# Patient Record
Sex: MALE | URBAN - METROPOLITAN AREA
[De-identification: names, ages, dates, MRNs, and addresses within clinical notes are randomized per-mention and may not be internally consistent; named-entity substitution may affect disease eponyms.]

---

## 2018-02-17 ENCOUNTER — EMERGENCY (EMERGENCY)
Facility: HOSPITAL | Age: 28
LOS: 1 days | Discharge: PSYCHIATRIC OR SUBSTANCE USE FACILITY | End: 2018-02-19
Attending: EMERGENCY MEDICINE | Admitting: EMERGENCY MEDICINE
Payer: SELF-PAY

## 2018-02-17 VITALS
HEIGHT: 68 IN | OXYGEN SATURATION: 99 % | HEART RATE: 55 BPM | RESPIRATION RATE: 16 BRPM | DIASTOLIC BLOOD PRESSURE: 88 MMHG | WEIGHT: 149.91 LBS | TEMPERATURE: 98 F | SYSTOLIC BLOOD PRESSURE: 124 MMHG

## 2018-02-17 DIAGNOSIS — R41.82 ALTERED MENTAL STATUS, UNSPECIFIED: ICD-10-CM

## 2018-02-17 DIAGNOSIS — R41.0 DISORIENTATION, UNSPECIFIED: ICD-10-CM

## 2018-02-17 LAB
ALBUMIN SERPL ELPH-MCNC: 4.2 G/DL — SIGNIFICANT CHANGE UP (ref 3.3–5)
ALP SERPL-CCNC: 60 U/L — SIGNIFICANT CHANGE UP (ref 40–120)
ALT FLD-CCNC: 35 U/L — SIGNIFICANT CHANGE UP (ref 12–78)
AMPHET UR-MCNC: NEGATIVE — SIGNIFICANT CHANGE UP
ANION GAP SERPL CALC-SCNC: 7 MMOL/L — SIGNIFICANT CHANGE UP (ref 5–17)
APAP SERPL-MCNC: < 2 UG/ML — SIGNIFICANT CHANGE UP (ref 10–30)
AST SERPL-CCNC: 29 U/L — SIGNIFICANT CHANGE UP (ref 15–37)
BARBITURATES UR SCN-MCNC: NEGATIVE — SIGNIFICANT CHANGE UP
BASOPHILS # BLD AUTO: 0.1 K/UL — SIGNIFICANT CHANGE UP (ref 0–0.2)
BASOPHILS NFR BLD AUTO: 1.4 % — SIGNIFICANT CHANGE UP (ref 0–2)
BENZODIAZ UR-MCNC: NEGATIVE — SIGNIFICANT CHANGE UP
BILIRUB SERPL-MCNC: 0.9 MG/DL — SIGNIFICANT CHANGE UP (ref 0.2–1.2)
BUN SERPL-MCNC: 12 MG/DL — SIGNIFICANT CHANGE UP (ref 7–23)
CALCIUM SERPL-MCNC: 8.6 MG/DL — SIGNIFICANT CHANGE UP (ref 8.5–10.1)
CHLORIDE SERPL-SCNC: 106 MMOL/L — SIGNIFICANT CHANGE UP (ref 96–108)
CO2 SERPL-SCNC: 29 MMOL/L — SIGNIFICANT CHANGE UP (ref 22–31)
COCAINE METAB.OTHER UR-MCNC: NEGATIVE — SIGNIFICANT CHANGE UP
CREAT SERPL-MCNC: 1.01 MG/DL — SIGNIFICANT CHANGE UP (ref 0.5–1.3)
EOSINOPHIL # BLD AUTO: 0.2 K/UL — SIGNIFICANT CHANGE UP (ref 0–0.5)
EOSINOPHIL NFR BLD AUTO: 3.1 % — SIGNIFICANT CHANGE UP (ref 0–6)
ETHANOL SERPL-MCNC: <10 MG/DL — SIGNIFICANT CHANGE UP (ref 0–10)
GLUCOSE BLDC GLUCOMTR-MCNC: 81 MG/DL — SIGNIFICANT CHANGE UP (ref 70–99)
GLUCOSE SERPL-MCNC: 105 MG/DL — HIGH (ref 70–99)
HCT VFR BLD CALC: 44.1 % — SIGNIFICANT CHANGE UP (ref 39–50)
HGB BLD-MCNC: 14.9 G/DL — SIGNIFICANT CHANGE UP (ref 13–17)
LDH SERPL L TO P-CCNC: 167 U/L — SIGNIFICANT CHANGE UP (ref 84–241)
LYMPHOCYTES # BLD AUTO: 1.8 K/UL — SIGNIFICANT CHANGE UP (ref 1–3.3)
LYMPHOCYTES # BLD AUTO: 23.2 % — SIGNIFICANT CHANGE UP (ref 13–44)
MCHC RBC-ENTMCNC: 31.3 PG — SIGNIFICANT CHANGE UP (ref 27–34)
MCHC RBC-ENTMCNC: 33.8 GM/DL — SIGNIFICANT CHANGE UP (ref 32–36)
MCV RBC AUTO: 92.6 FL — SIGNIFICANT CHANGE UP (ref 80–100)
METHADONE UR-MCNC: NEGATIVE — SIGNIFICANT CHANGE UP
MONOCYTES # BLD AUTO: 0.7 K/UL — SIGNIFICANT CHANGE UP (ref 0–0.9)
MONOCYTES NFR BLD AUTO: 9 % — SIGNIFICANT CHANGE UP (ref 2–14)
NEUTROPHILS # BLD AUTO: 5 K/UL — SIGNIFICANT CHANGE UP (ref 1.8–7.4)
NEUTROPHILS NFR BLD AUTO: 63.3 % — SIGNIFICANT CHANGE UP (ref 43–77)
OPIATES UR-MCNC: NEGATIVE — SIGNIFICANT CHANGE UP
PCP SPEC-MCNC: SIGNIFICANT CHANGE UP
PCP UR-MCNC: NEGATIVE — SIGNIFICANT CHANGE UP
PLATELET # BLD AUTO: 174 K/UL — SIGNIFICANT CHANGE UP (ref 150–400)
POTASSIUM SERPL-MCNC: 3.7 MMOL/L — SIGNIFICANT CHANGE UP (ref 3.5–5.3)
POTASSIUM SERPL-SCNC: 3.7 MMOL/L — SIGNIFICANT CHANGE UP (ref 3.5–5.3)
PROT SERPL-MCNC: 7.6 GM/DL — SIGNIFICANT CHANGE UP (ref 6–8.3)
RBC # BLD: 4.76 M/UL — SIGNIFICANT CHANGE UP (ref 4.2–5.8)
RBC # FLD: 11.2 % — SIGNIFICANT CHANGE UP (ref 10.3–14.5)
SALICYLATES SERPL-MCNC: <1.7 MG/DL — LOW (ref 2.8–20)
SODIUM SERPL-SCNC: 142 MMOL/L — SIGNIFICANT CHANGE UP (ref 135–145)
THC UR QL: NEGATIVE — SIGNIFICANT CHANGE UP
WBC # BLD: 7.9 K/UL — SIGNIFICANT CHANGE UP (ref 3.8–10.5)
WBC # FLD AUTO: 7.9 K/UL — SIGNIFICANT CHANGE UP (ref 3.8–10.5)

## 2018-02-17 PROCEDURE — 93010 ELECTROCARDIOGRAM REPORT: CPT

## 2018-02-17 PROCEDURE — 70450 CT HEAD/BRAIN W/O DYE: CPT | Mod: 26

## 2018-02-17 PROCEDURE — 99053 MED SERV 10PM-8AM 24 HR FAC: CPT

## 2018-02-17 PROCEDURE — 99285 EMERGENCY DEPT VISIT HI MDM: CPT | Mod: 25

## 2018-02-17 PROCEDURE — 90792 PSYCH DIAG EVAL W/MED SRVCS: CPT | Mod: GT
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
## 2018-02-17 NOTE — ED BEHAVIORAL HEALTH ASSESSMENT NOTE - SUBSTANCE ISSUES AND PLAN (INCLUDE STANDING AND PRN MEDICATION)
PRN for agitation: Check EKG, if QTC<500, can use haldol 5mg po q6hr prn agitation, ativan 2mg po q6hr prn agitation - hold haldol if QTC> 500;

## 2018-02-17 NOTE — ED PROVIDER NOTE - NS_ ATTENDINGSCRIBEDETAILS _ED_A_ED_FT
I, Kurtis Hernandez MD,  performed the initial face to face bedside interview with this patient regarding history of present illness, review of symptoms and relevant past medical, social and family history.  I completed an independent physical examination.    The history, relevant review of systems, past medical and surgical history, medical decision making, and physical examination was documented by the scribe in my presence and I attest to the accuracy of the documentation.
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
## 2018-02-17 NOTE — ED BEHAVIORAL HEALTH ASSESSMENT NOTE - DETAILS
MD made aware of need for hold and re-eval by psych MD made aware Hypoglycemic when found on the streets

## 2018-02-17 NOTE — ED ADULT TRIAGE NOTE - CHIEF COMPLAINT QUOTE
Pt. to the ED BIBA found in street confused- As per EMS, BGM was 59 and patient was given glucose - BGM 95- Pt. poor historian at this time- [facility]x

## 2018-02-17 NOTE — ED PROVIDER NOTE - MUSCULOSKELETAL, MLM
Spine appears normal, range of motion is not limited, no muscle or joint tenderness. No evidence of trauma.

## 2018-02-17 NOTE — ED PROVIDER NOTE - MEDICAL DECISION MAKING DETAILS
28 y/o M BIBEMS for confusion, AMS, low BGM of 59mg/dL on scene, glucose administered with positive response to 95mg/dL with plans to receive labs, CBC, EtOH level, CT imaging, EKG

## 2018-02-17 NOTE — ED PROVIDER NOTE - PROGRESS NOTE DETAILS
AS:  pt received from Dr Hernandez at shift change.  Pt seen by telepsych.  Pt too disoriented for conversation, and no collateral, would like to hold pt overnight and reevaluate in am.

## 2018-02-17 NOTE — ED BEHAVIORAL HEALTH ASSESSMENT NOTE - DESCRIPTION
No beh issues - noted to have AMS upon arrival Unable to assess Reports has 2 brothers but didn't know phone numbers; no phone in his possession upon arrival to hospital (per hospital staff)

## 2018-02-17 NOTE — ED BEHAVIORAL HEALTH ASSESSMENT NOTE - OTHER
unable to assess Limited ability to assess due to cognitive issues Possible delusions regarding blood contaminated? demons? Unable to fully assess given cognitive issues Passkrishna in the street Unknown-unable to assess Unknown -unable to assess Unable to assess Unable to asess Unclear housing/social support

## 2018-02-17 NOTE — ED BEHAVIORAL HEALTH ASSESSMENT NOTE - HPI (INCLUDE ILLNESS QUALITY, SEVERITY, DURATION, TIMING, CONTEXT, MODIFYING FACTORS, ASSOCIATED SIGNS AND SYMPTOMS)
Patient with unknown past psych history, unknown domicility, unknown employment, unknown medical history was found wandering in the streets and was noted to be hypoglyceminc in the fields. After medical optimization with glucose and neg preliminary medical work up (inc CT, CBC, BMP, utox) patient was asked to be assessed by psychiatry for making unusual statements such as that "his blood was contaminated by demon."      Pt was seen and evaluated with Upper sor[place] phone  - he was a limited historian; impaired attention/concentration, disoriented and at times, not responding to questions directed by . Patient stated: "They took everything I had." However, when asked to clarify, who these people were, he was unable to do so. Patient denied any past medical history, denied past psychiatric history. Patient had difficulties concentrating on remainder of the evaluation would hold on to the phone and not verbalize at majority of the questions inc questions regarding mood symptoms and psychotic symptoms.     On cognitive exam, patient was noted to be awake, but oriented to his name but not place (inc city, state) or date or situation. Patient had impaired attention/concentration. Patient was unable to comply with remainder of MMSE screening.

## 2018-02-17 NOTE — ED BEHAVIORAL HEALTH ASSESSMENT NOTE - REASON FOR REFERRAL
Patient presented with AMS - hypoglycemic in the fields; making unusual statement about "blood was contaminated by a demon."

## 2018-02-17 NOTE — ED ADULT NURSE NOTE - ED STAT RN HANDOFF DETAILS
Pt received from prior RN awake but appearing confused. Pt denies pain.  Hygiene performed.  Will cont to monitor.

## 2018-02-18 VITALS
HEART RATE: 54 BPM | SYSTOLIC BLOOD PRESSURE: 117 MMHG | DIASTOLIC BLOOD PRESSURE: 73 MMHG | TEMPERATURE: 98 F | OXYGEN SATURATION: 99 %

## 2018-02-18 DIAGNOSIS — R41.0 DISORIENTATION, UNSPECIFIED: ICD-10-CM

## 2018-02-18 LAB
ADD ON TEST-SPECIMEN IN LAB: SIGNIFICANT CHANGE UP
APPEARANCE UR: (no result)
APPEARANCE UR: CLEAR — SIGNIFICANT CHANGE UP
BACTERIA # UR AUTO: (no result)
BACTERIA # UR AUTO: (no result)
BILIRUB UR-MCNC: NEGATIVE — SIGNIFICANT CHANGE UP
BILIRUB UR-MCNC: NEGATIVE — SIGNIFICANT CHANGE UP
COLOR SPEC: YELLOW — SIGNIFICANT CHANGE UP
COLOR SPEC: YELLOW — SIGNIFICANT CHANGE UP
DIFF PNL FLD: (no result)
DIFF PNL FLD: (no result)
EPI CELLS # UR: SIGNIFICANT CHANGE UP
EPI CELLS # UR: SIGNIFICANT CHANGE UP
FOLATE SERPL-MCNC: 10.1 NG/ML — SIGNIFICANT CHANGE UP (ref 4.8–24.2)
GLUCOSE BLDC GLUCOMTR-MCNC: 82 MG/DL — SIGNIFICANT CHANGE UP (ref 70–99)
GLUCOSE BLDC GLUCOMTR-MCNC: 99 MG/DL — SIGNIFICANT CHANGE UP (ref 70–99)
GLUCOSE UR QL: NEGATIVE MG/DL — SIGNIFICANT CHANGE UP
GLUCOSE UR QL: NEGATIVE MG/DL — SIGNIFICANT CHANGE UP
KETONES UR-MCNC: (no result)
KETONES UR-MCNC: NEGATIVE — SIGNIFICANT CHANGE UP
LEUKOCYTE ESTERASE UR-ACNC: NEGATIVE — SIGNIFICANT CHANGE UP
LEUKOCYTE ESTERASE UR-ACNC: NEGATIVE — SIGNIFICANT CHANGE UP
NITRITE UR-MCNC: NEGATIVE — SIGNIFICANT CHANGE UP
NITRITE UR-MCNC: NEGATIVE — SIGNIFICANT CHANGE UP
PH UR: 5 — SIGNIFICANT CHANGE UP (ref 5–8)
PH UR: 6 — SIGNIFICANT CHANGE UP (ref 5–8)
PROT UR-MCNC: 15 MG/DL
PROT UR-MCNC: NEGATIVE MG/DL — SIGNIFICANT CHANGE UP
RBC CASTS # UR COMP ASSIST: >50 /HPF (ref 0–4)
RBC CASTS # UR COMP ASSIST: >50 /HPF (ref 0–4)
SP GR SPEC: 1.01 — SIGNIFICANT CHANGE UP (ref 1.01–1.02)
SP GR SPEC: 1.01 — SIGNIFICANT CHANGE UP (ref 1.01–1.02)
TSH SERPL-MCNC: 3.14 UIU/ML — SIGNIFICANT CHANGE UP (ref 0.36–3.74)
UROBILINOGEN FLD QL: NEGATIVE MG/DL — SIGNIFICANT CHANGE UP
UROBILINOGEN FLD QL: NEGATIVE MG/DL — SIGNIFICANT CHANGE UP
VIT B12 SERPL-MCNC: >2000 PG/ML — HIGH (ref 232–1245)
WBC UR QL: SIGNIFICANT CHANGE UP
WBC UR QL: SIGNIFICANT CHANGE UP

## 2018-02-18 PROCEDURE — 74176 CT ABD & PELVIS W/O CONTRAST: CPT | Mod: 26

## 2018-02-18 RX ORDER — RISPERIDONE 4 MG/1
1 TABLET ORAL ONCE
Qty: 0 | Refills: 0 | Status: COMPLETED | OUTPATIENT
Start: 2018-02-18 | End: 2018-02-18

## 2018-02-18 RX ADMIN — RISPERIDONE 1 MILLIGRAM(S): 4 TABLET ORAL at 13:29
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
## 2018-02-18 NOTE — ED ADULT NURSE REASSESSMENT NOTE - NS ED NURSE REASSESS COMMENT FT1
Pt report given to RN and care transferred. Awaiting transport to facility.
Received report from Kathy Alfaro RN. Pt resting calmly in bed in room. No complaints at present. Pt awaiting transfer to [facility]. Kathy states that report has been given to EMS and that psych, NP has given report to facility. Comfort and safety maintained. Will continue to monitor.
Pt assisted with breakfast. Ate yogurt and juice with assistance and then ate apples independently.  Awaiting further dispo at this time.  Remains awake and alert.
pt to be reevaluated by psych this AM. VSS. Updated on plan of care, pt cooperative. Safety maintained, will continue to monitor.
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
## 2018-02-19 LAB — T PALLIDUM AB TITR SER: NEGATIVE — SIGNIFICANT CHANGE UP

## 2018-08-03 ENCOUNTER — INPATIENT (INPATIENT)
Facility: HOSPITAL | Age: 28
LOS: 17 days | Discharge: ANOTHER HOSPITAL | End: 2018-08-21
Attending: PSYCHIATRY & NEUROLOGY | Admitting: PSYCHIATRY & NEUROLOGY
Payer: MEDICAID

## 2018-08-03 VITALS
RESPIRATION RATE: 15 BRPM | SYSTOLIC BLOOD PRESSURE: 121 MMHG | TEMPERATURE: 99 F | DIASTOLIC BLOOD PRESSURE: 87 MMHG | HEART RATE: 57 BPM | OXYGEN SATURATION: 100 %

## 2018-08-03 DIAGNOSIS — F29 UNSPECIFIED PSYCHOSIS NOT DUE TO A SUBSTANCE OR KNOWN PHYSIOLOGICAL CONDITION: ICD-10-CM

## 2018-08-03 DIAGNOSIS — R69 ILLNESS, UNSPECIFIED: ICD-10-CM

## 2018-08-03 LAB
ALBUMIN SERPL ELPH-MCNC: 4.8 G/DL — SIGNIFICANT CHANGE UP (ref 3.3–5)
ALP SERPL-CCNC: 58 U/L — SIGNIFICANT CHANGE UP (ref 40–120)
ALT FLD-CCNC: 23 U/L — SIGNIFICANT CHANGE UP (ref 4–41)
AMPHET UR-MCNC: NEGATIVE — SIGNIFICANT CHANGE UP
APAP SERPL-MCNC: < 15 UG/ML — LOW (ref 15–25)
APPEARANCE UR: CLEAR — SIGNIFICANT CHANGE UP
AST SERPL-CCNC: 23 U/L — SIGNIFICANT CHANGE UP (ref 4–40)
BACTERIA # UR AUTO: SIGNIFICANT CHANGE UP
BARBITURATES UR SCN-MCNC: NEGATIVE — SIGNIFICANT CHANGE UP
BASOPHILS # BLD AUTO: 0.1 K/UL — SIGNIFICANT CHANGE UP (ref 0–0.2)
BASOPHILS NFR BLD AUTO: 1.5 % — SIGNIFICANT CHANGE UP (ref 0–2)
BENZODIAZ UR-MCNC: NEGATIVE — SIGNIFICANT CHANGE UP
BILIRUB SERPL-MCNC: 1.5 MG/DL — HIGH (ref 0.2–1.2)
BILIRUB UR-MCNC: SIGNIFICANT CHANGE UP
BLOOD UR QL VISUAL: NEGATIVE — SIGNIFICANT CHANGE UP
BUN SERPL-MCNC: 16 MG/DL — SIGNIFICANT CHANGE UP (ref 7–23)
CALCIUM SERPL-MCNC: 9.4 MG/DL — SIGNIFICANT CHANGE UP (ref 8.4–10.5)
CANNABINOIDS UR-MCNC: NEGATIVE — SIGNIFICANT CHANGE UP
CHLORIDE SERPL-SCNC: 101 MMOL/L — SIGNIFICANT CHANGE UP (ref 98–107)
CO2 SERPL-SCNC: 25 MMOL/L — SIGNIFICANT CHANGE UP (ref 22–31)
COCAINE METAB.OTHER UR-MCNC: NEGATIVE — SIGNIFICANT CHANGE UP
COLOR SPEC: YELLOW — SIGNIFICANT CHANGE UP
CREAT SERPL-MCNC: 1.03 MG/DL — SIGNIFICANT CHANGE UP (ref 0.5–1.3)
EOSINOPHIL # BLD AUTO: 0.47 K/UL — SIGNIFICANT CHANGE UP (ref 0–0.5)
EOSINOPHIL NFR BLD AUTO: 6.9 % — HIGH (ref 0–6)
ETHANOL BLD-MCNC: < 10 MG/DL — SIGNIFICANT CHANGE UP
GLUCOSE SERPL-MCNC: 89 MG/DL — SIGNIFICANT CHANGE UP (ref 70–99)
GLUCOSE UR-MCNC: NEGATIVE — SIGNIFICANT CHANGE UP
HCT VFR BLD CALC: 49.6 % — SIGNIFICANT CHANGE UP (ref 39–50)
HGB BLD-MCNC: 16.6 G/DL — SIGNIFICANT CHANGE UP (ref 13–17)
IMM GRANULOCYTES # BLD AUTO: 0.02 # — SIGNIFICANT CHANGE UP
IMM GRANULOCYTES NFR BLD AUTO: 0.3 % — SIGNIFICANT CHANGE UP (ref 0–1.5)
KETONES UR-MCNC: SIGNIFICANT CHANGE UP
LEUKOCYTE ESTERASE UR-ACNC: NEGATIVE — SIGNIFICANT CHANGE UP
LYMPHOCYTES # BLD AUTO: 2.62 K/UL — SIGNIFICANT CHANGE UP (ref 1–3.3)
LYMPHOCYTES # BLD AUTO: 38.2 % — SIGNIFICANT CHANGE UP (ref 13–44)
MCHC RBC-ENTMCNC: 30.7 PG — SIGNIFICANT CHANGE UP (ref 27–34)
MCHC RBC-ENTMCNC: 33.5 % — SIGNIFICANT CHANGE UP (ref 32–36)
MCV RBC AUTO: 91.9 FL — SIGNIFICANT CHANGE UP (ref 80–100)
METHADONE UR-MCNC: NEGATIVE — SIGNIFICANT CHANGE UP
MONOCYTES # BLD AUTO: 0.6 K/UL — SIGNIFICANT CHANGE UP (ref 0–0.9)
MONOCYTES NFR BLD AUTO: 8.7 % — SIGNIFICANT CHANGE UP (ref 2–14)
MUCOUS THREADS # UR AUTO: SIGNIFICANT CHANGE UP
NEUTROPHILS # BLD AUTO: 3.05 K/UL — SIGNIFICANT CHANGE UP (ref 1.8–7.4)
NEUTROPHILS NFR BLD AUTO: 44.4 % — SIGNIFICANT CHANGE UP (ref 43–77)
NITRITE UR-MCNC: NEGATIVE — SIGNIFICANT CHANGE UP
NRBC # FLD: 0 — SIGNIFICANT CHANGE UP
OPIATES UR-MCNC: NEGATIVE — SIGNIFICANT CHANGE UP
OXYCODONE UR-MCNC: NEGATIVE — SIGNIFICANT CHANGE UP
PCP UR-MCNC: NEGATIVE — SIGNIFICANT CHANGE UP
PH UR: 6 — SIGNIFICANT CHANGE UP (ref 4.6–8)
PLATELET # BLD AUTO: 201 K/UL — SIGNIFICANT CHANGE UP (ref 150–400)
PMV BLD: 11.6 FL — SIGNIFICANT CHANGE UP (ref 7–13)
POTASSIUM SERPL-MCNC: 4 MMOL/L — SIGNIFICANT CHANGE UP (ref 3.5–5.3)
POTASSIUM SERPL-SCNC: 4 MMOL/L — SIGNIFICANT CHANGE UP (ref 3.5–5.3)
PROT SERPL-MCNC: 7.6 G/DL — SIGNIFICANT CHANGE UP (ref 6–8.3)
PROT UR-MCNC: 100 MG/DL — SIGNIFICANT CHANGE UP
RBC # BLD: 5.4 M/UL — SIGNIFICANT CHANGE UP (ref 4.2–5.8)
RBC # FLD: 11.6 % — SIGNIFICANT CHANGE UP (ref 10.3–14.5)
RBC CASTS # UR COMP ASSIST: SIGNIFICANT CHANGE UP (ref 0–?)
SALICYLATES SERPL-MCNC: < 5 MG/DL — LOW (ref 15–30)
SODIUM SERPL-SCNC: 141 MMOL/L — SIGNIFICANT CHANGE UP (ref 135–145)
SP GR SPEC: 1.04 — SIGNIFICANT CHANGE UP (ref 1–1.04)
TSH SERPL-MCNC: 3.91 UIU/ML — SIGNIFICANT CHANGE UP (ref 0.27–4.2)
UROBILINOGEN FLD QL: >8 MG/DL — SIGNIFICANT CHANGE UP
WBC # BLD: 6.86 K/UL — SIGNIFICANT CHANGE UP (ref 3.8–10.5)
WBC # FLD AUTO: 6.86 K/UL — SIGNIFICANT CHANGE UP (ref 3.8–10.5)
WBC UR QL: SIGNIFICANT CHANGE UP (ref 0–?)

## 2018-08-03 PROCEDURE — 99285 EMERGENCY DEPT VISIT HI MDM: CPT

## 2018-08-03 PROCEDURE — 99222 1ST HOSP IP/OBS MODERATE 55: CPT

## 2018-08-03 RX ORDER — RISPERIDONE 4 MG/1
2 TABLET ORAL AT BEDTIME
Qty: 0 | Refills: 0 | Status: DISCONTINUED | OUTPATIENT
Start: 2018-08-03 | End: 2018-08-03

## 2018-08-03 RX ORDER — HALOPERIDOL DECANOATE 100 MG/ML
5 INJECTION INTRAMUSCULAR ONCE
Qty: 0 | Refills: 0 | Status: COMPLETED | OUTPATIENT
Start: 2018-08-03 | End: 2018-08-03

## 2018-08-03 RX ORDER — DIPHENHYDRAMINE HCL 50 MG
50 CAPSULE ORAL ONCE
Qty: 0 | Refills: 0 | Status: COMPLETED | OUTPATIENT
Start: 2018-08-03 | End: 2018-08-03

## 2018-08-03 RX ORDER — HALOPERIDOL DECANOATE 100 MG/ML
5 INJECTION INTRAMUSCULAR EVERY 6 HOURS
Qty: 0 | Refills: 0 | Status: DISCONTINUED | OUTPATIENT
Start: 2018-08-03 | End: 2018-08-03

## 2018-08-03 RX ORDER — DIPHENHYDRAMINE HCL 50 MG
50 CAPSULE ORAL ONCE
Qty: 0 | Refills: 0 | Status: DISCONTINUED | OUTPATIENT
Start: 2018-08-03 | End: 2018-08-21

## 2018-08-03 RX ORDER — HALOPERIDOL DECANOATE 100 MG/ML
5 INJECTION INTRAMUSCULAR EVERY 4 HOURS
Qty: 0 | Refills: 0 | Status: DISCONTINUED | OUTPATIENT
Start: 2018-08-03 | End: 2018-08-21

## 2018-08-03 RX ORDER — RISPERIDONE 4 MG/1
2 TABLET ORAL AT BEDTIME
Qty: 0 | Refills: 0 | Status: DISCONTINUED | OUTPATIENT
Start: 2018-08-03 | End: 2018-08-04

## 2018-08-03 RX ORDER — HALOPERIDOL DECANOATE 100 MG/ML
5 INJECTION INTRAMUSCULAR ONCE
Qty: 0 | Refills: 0 | Status: DISCONTINUED | OUTPATIENT
Start: 2018-08-03 | End: 2018-08-21

## 2018-08-03 RX ORDER — DIPHENHYDRAMINE HCL 50 MG
50 CAPSULE ORAL EVERY 4 HOURS
Qty: 0 | Refills: 0 | Status: DISCONTINUED | OUTPATIENT
Start: 2018-08-03 | End: 2018-08-21

## 2018-08-03 RX ORDER — HALOPERIDOL DECANOATE 100 MG/ML
5 INJECTION INTRAMUSCULAR ONCE
Qty: 0 | Refills: 0 | Status: DISCONTINUED | OUTPATIENT
Start: 2018-08-03 | End: 2018-08-03

## 2018-08-03 RX ADMIN — HALOPERIDOL DECANOATE 5 MILLIGRAM(S): 100 INJECTION INTRAMUSCULAR at 01:00

## 2018-08-03 RX ADMIN — RISPERIDONE 2 MILLIGRAM(S): 4 TABLET ORAL at 21:28

## 2018-08-03 RX ADMIN — Medication 2 MILLIGRAM(S): at 01:00

## 2018-08-03 RX ADMIN — Medication 50 MILLIGRAM(S): at 01:00
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
## 2018-08-03 NOTE — ED BEHAVIORAL HEALTH NOTE - BEHAVIORAL HEALTH NOTE
Writer called Mauricio Edgar [phone] pt’s brother who provided the following collateral.  Patient is single, no children.  Patient is undocumented, employed in Construction.  He reports calling 911 today because patient was talking to himself, yelling someone was attacking him, demons don’t let him eat or sleep.  He states patient hasn’t slept since Saturday and is not accepting food from his family stating the demons will not let him eat.    Patient was working in construction and was fine until Friday.  Patient began hearing things on Saturday and hasn’t been to work since.  Patient was living on his own and just showed up to brothers home was i yesterday.  Pt’s sister in law walked in to her home patient was just there.  She told her pt’s brother patient was talking to himself and she felt uncomfortable.  Patient had lived with them in the past, but then moved out on his own.  He states it is odd that patient wouldn’t call and would just let himself into the house.  Patient was screaming this morning, “They are attacking me, demons are attacking me” and brother called 911. Patient has a history of marijuana use, history of crack use 2 years ago, no known recent drug use.  Patient has a history of psychiatric issues for similar presentation.  Patient was prescribed medication, but does not have insurance and could not afford medication.  Patient was hospitalized once before but unknown where, or for how long.  No history of suicide attempts, no history of violence.  Last time patient was admitted his Rastafarian sister Mary Wells  had him admitted.

## 2018-08-03 NOTE — ED ADULT NURSE NOTE - OBJECTIVE STATEMENT
BIB ems, brother called 911 for patient's bizarre behaviors at home. Pt is Citizen of [place] speaking. Awake, calm, appears paranoid towards others. Pt refusing to answer questions with  phone.

## 2018-08-03 NOTE — ED BEHAVIORAL HEALTH ASSESSMENT NOTE - DIFFERENTIAL
schizophrenia  schizoaffective disorder  substance-induced psychosis schizophrenia  schizoaffective disorder  substance-induced psychosis (note: urine tox negative)

## 2018-08-03 NOTE — ED BEHAVIORAL HEALTH NOTE - BEHAVIORAL HEALTH NOTE
Patient was sent to  for psych evaluation for bizarre behavior I tried to evaluate him earlier ; he refused to talk that time . He also refuses to answer questions second time, answers "I don't know" and closes his eyes and covers his head with a blanket. Patient is not agitated, He slept all night long and did not need any meds Unable to evaluate pt

## 2018-08-03 NOTE — ED BEHAVIORAL HEALTH ASSESSMENT NOTE - PSYCHIATRIC ISSUES AND PLAN (INCLUDE STANDING AND PRN MEDICATION)
Likely noncompliant with Risperdal. Start Risperdal 2 mg po QHS for psychosis; can use Haldol 5 mg, Ativan 2 mg PO/IM prn agitation

## 2018-08-03 NOTE — ED PROVIDER NOTE - MEDICAL DECISION MAKING DETAILS
29 y/o M BIB EMS for hallucinations and bizarre behavior, reported h/o ?psychosis per report.  Pt is not responding to questions on exam, refusing to participate.  Will obtain labs, incl tox, psych consult.
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
## 2018-08-03 NOTE — ED BEHAVIORAL HEALTH ASSESSMENT NOTE - DESCRIPTION
none known see HPI superficially cooperative, in behavioral control    Vital Signs Last 24 Hrs  T(C): 36.8 (03 Aug 2018 06:41), Max: 37.1 (03 Aug 2018 00:11)  T(F): 98.2 (03 Aug 2018 06:41), Max: 98.8 (03 Aug 2018 00:11)  HR: 66 (03 Aug 2018 06:41) (57 - 66)  BP: 120/82 (03 Aug 2018 06:41) (120/82 - 121/87)  BP(mean): --  RR: 16 (03 Aug 2018 06:41) (15 - 16)  SpO2: 100% (03 Aug 2018 06:41) (100% - 100%)

## 2018-08-03 NOTE — ED BEHAVIORAL HEALTH ASSESSMENT NOTE - SUMMARY
27 y/o Malay-speaking male, single, noncaregiver, lives alone, unemployed, with unclear psychiatric history, possible history of unspecified psychosis per separate [place] chart ED visit on 2/17/18 (Ramon Sandhu MR#[id number]) at [facility] at which time pt was admitted on 9.27 legal status (to unknown hospital, no records in CVM), no other known psychiatric admissions, no current outpatient treatment, EMS run sheet indicates he is/was prescribed Risperdal and Cogentin (unknown doses), no known history of suicide attempts or violence, no known legal issues, no known PMH, past history of crack-cocaine and marijuana use per brother, BIBEMS activated by pt's brother due to agitation, paranoid delusions, and talking to himself in context of medication noncompliance for at least 1 month. 27 y/o Portuguese-speaking male, single, noncaregiver, lives alone, unemployed, with unclear psychiatric history, possible history of unspecified psychosis per separate [place] chart ED visit on 2/17/18 (Ramon aSndhu MR#[id number]) at [facility] at which time pt was admitted on 9.27 legal status (to unknown hospital, no records in CVM), no other known psychiatric admissions, no current outpatient treatment, EMS run sheet indicates he is/was prescribed Risperdal and Cogentin (unknown doses), no known history of suicide attempts or violence, no known legal issues, no known PMH, past history of crack-cocaine and marijuana use per brother, BIBEMS activated by pt's brother due to agitation, paranoid delusions, and talking to himself in context of medication noncompliance for at least 1 month.    Patient agitated on arrival and currently presents with disorganized thought process, paranoid/somatic delusions. Per brother, pt agitated, paranoid, talking to himself, likely not sleeping or eating for several days. Pt presents acute danger to self and requires inpatient psychiatric admission for safety and stabilization. 27 y/o Syriac-speaking male, single, noncaregiver, lives alone, unemployed, with unclear psychiatric history, possible history of unspecified psychosis per separate [place] chart ED visit on 2/17/18 (Ramon Sandhu MR#[id number]) at [facility] at which time pt was admitted on 9.27 legal status (to unknown hospital, no records in CV), no other known psychiatric admissions, no current outpatient treatment, EMS run sheet indicates he is/was prescribed Risperdal and Cogentin (unknown doses), no known history of suicide attempts or violence, no known legal issues, no known PMH, past history of crack-cocaine and marijuana use per brother (today's urine tox is negative), BIBEMS activated by pt's brother due to agitation, paranoid delusions, and talking to himself in context of medication noncompliance for at least 1 month.    Patient agitated on arrival and currently presents with disorganized thought process, paranoid/somatic delusions. Per brother, pt agitated, paranoid, talking to himself, likely not sleeping or eating for several days. Pt presents acute danger to self and requires inpatient psychiatric admission for safety and stabilization.

## 2018-08-03 NOTE — ED ADULT NURSE NOTE - NSIMPLEMENTINTERV_GEN_ALL_ED
Implemented All Fall Risk Interventions:  [place] to call system. Call bell, personal items and telephone within reach. Instruct patient to call for assistance. Room bathroom lighting operational. Non-slip footwear when patient is off stretcher. Physically safe environment: no spills, clutter or unnecessary equipment. Stretcher in lowest position, wheels locked, appropriate side rails in place. Provide visual cue, wrist band, yellow gown, etc. Monitor gait and stability. Monitor for mental status changes and reorient to person, place, and time. Review medications for side effects contributing to fall risk. Reinforce activity limits and safety measures with patient and family.

## 2018-08-03 NOTE — ED PROVIDER NOTE - OBJECTIVE STATEMENT
29 y/o M with unknown past medical or psychiatric hx BIB EMS for hallucinations and bizarre behavior.  Per EMS report, pt recently seen at another hospital but left (pt is wearing a hospital ID band) and brother called tonight because pt was acting strange, reporting that he was being shot.  Pt is primarily Anguillan speaking.  I attempted to evaluate the patient with a staff member that spoke Croatian as well as phone interpretor (#[phone]).  Pt is refusing to answer any questions, unable to provide any history.  Attempted to call contact number listed in chart, but no answer.

## 2018-08-03 NOTE — ED ADULT NURSE REASSESSMENT NOTE - REASSESS COMMUNICATION
ED physician notified/Raphael SILVA made aware, pt ordered to receive STAT Ativan 2/Haldol 5/Benadryl 50mg IM.

## 2018-08-03 NOTE — ED ADULT NURSE REASSESSMENT NOTE - GENERAL PATIENT STATE
s/p IM meds received, aroused by tactile stimuli. VSS, NAD/drowsy
no change observed/pt remains awake, appears internally preoccupied., Venezuelan speaking with  at bedside, pt heard speaking about demons. Pt uncooperative with initial blood draw and ekg.

## 2018-08-04 PROCEDURE — 99231 SBSQ HOSP IP/OBS SF/LOW 25: CPT

## 2018-08-04 RX ORDER — RISPERIDONE 4 MG/1
3 TABLET ORAL AT BEDTIME
Qty: 0 | Refills: 0 | Status: DISCONTINUED | OUTPATIENT
Start: 2018-08-04 | End: 2018-08-06

## 2018-08-04 RX ADMIN — RISPERIDONE 3 MILLIGRAM(S): 4 TABLET ORAL at 21:23

## 2018-08-05 PROCEDURE — 99231 SBSQ HOSP IP/OBS SF/LOW 25: CPT

## 2018-08-05 RX ADMIN — RISPERIDONE 3 MILLIGRAM(S): 4 TABLET ORAL at 21:29

## 2018-08-06 PROCEDURE — 99232 SBSQ HOSP IP/OBS MODERATE 35: CPT

## 2018-08-06 RX ORDER — RISPERIDONE 4 MG/1
4 TABLET ORAL AT BEDTIME
Qty: 0 | Refills: 0 | Status: DISCONTINUED | OUTPATIENT
Start: 2018-08-06 | End: 2018-08-21

## 2018-08-06 RX ADMIN — RISPERIDONE 4 MILLIGRAM(S): 4 TABLET ORAL at 21:10

## 2018-08-07 PROCEDURE — 99232 SBSQ HOSP IP/OBS MODERATE 35: CPT

## 2018-08-07 RX ADMIN — RISPERIDONE 4 MILLIGRAM(S): 4 TABLET ORAL at 21:16

## 2018-08-08 PROCEDURE — 99232 SBSQ HOSP IP/OBS MODERATE 35: CPT

## 2018-08-08 RX ADMIN — RISPERIDONE 4 MILLIGRAM(S): 4 TABLET ORAL at 22:08

## 2018-08-09 PROCEDURE — 99232 SBSQ HOSP IP/OBS MODERATE 35: CPT

## 2018-08-09 RX ADMIN — RISPERIDONE 4 MILLIGRAM(S): 4 TABLET ORAL at 21:58

## 2018-08-10 PROCEDURE — 99231 SBSQ HOSP IP/OBS SF/LOW 25: CPT

## 2018-08-10 RX ADMIN — RISPERIDONE 4 MILLIGRAM(S): 4 TABLET ORAL at 21:43

## 2018-08-11 LAB
CHOLEST SERPL-MCNC: 210 MG/DL — HIGH (ref 120–199)
HBA1C BLD-MCNC: 5.6 % — SIGNIFICANT CHANGE UP (ref 4–5.6)
HDLC SERPL-MCNC: 35 MG/DL — SIGNIFICANT CHANGE UP (ref 35–55)
LIPID PNL WITH DIRECT LDL SERPL: 157 MG/DL — SIGNIFICANT CHANGE UP
TRIGL SERPL-MCNC: 141 MG/DL — SIGNIFICANT CHANGE UP (ref 10–149)

## 2018-08-11 RX ADMIN — RISPERIDONE 4 MILLIGRAM(S): 4 TABLET ORAL at 21:07

## 2018-08-12 RX ADMIN — RISPERIDONE 4 MILLIGRAM(S): 4 TABLET ORAL at 20:59

## 2018-08-13 PROCEDURE — 99232 SBSQ HOSP IP/OBS MODERATE 35: CPT

## 2018-08-13 RX ADMIN — RISPERIDONE 4 MILLIGRAM(S): 4 TABLET ORAL at 21:08

## 2018-08-14 PROCEDURE — 99232 SBSQ HOSP IP/OBS MODERATE 35: CPT

## 2018-08-14 RX ADMIN — RISPERIDONE 4 MILLIGRAM(S): 4 TABLET ORAL at 21:26

## 2018-08-15 PROCEDURE — 99232 SBSQ HOSP IP/OBS MODERATE 35: CPT

## 2018-08-15 RX ADMIN — RISPERIDONE 4 MILLIGRAM(S): 4 TABLET ORAL at 21:36

## 2018-08-16 PROCEDURE — 99231 SBSQ HOSP IP/OBS SF/LOW 25: CPT

## 2018-08-16 RX ADMIN — RISPERIDONE 4 MILLIGRAM(S): 4 TABLET ORAL at 21:02

## 2018-08-17 PROCEDURE — 99232 SBSQ HOSP IP/OBS MODERATE 35: CPT

## 2018-08-17 RX ADMIN — RISPERIDONE 4 MILLIGRAM(S): 4 TABLET ORAL at 21:21

## 2018-08-18 RX ADMIN — RISPERIDONE 4 MILLIGRAM(S): 4 TABLET ORAL at 20:41

## 2018-08-19 RX ADMIN — RISPERIDONE 4 MILLIGRAM(S): 4 TABLET ORAL at 21:37

## 2018-08-20 LAB
ALBUMIN SERPL ELPH-MCNC: 4.1 G/DL — SIGNIFICANT CHANGE UP (ref 3.3–5)
ALP SERPL-CCNC: 57 U/L — SIGNIFICANT CHANGE UP (ref 40–120)
ALT FLD-CCNC: 26 U/L — SIGNIFICANT CHANGE UP (ref 4–41)
AST SERPL-CCNC: 20 U/L — SIGNIFICANT CHANGE UP (ref 4–40)
BASOPHILS # BLD AUTO: 0.06 K/UL — SIGNIFICANT CHANGE UP (ref 0–0.2)
BASOPHILS NFR BLD AUTO: 0.6 % — SIGNIFICANT CHANGE UP (ref 0–2)
BILIRUB SERPL-MCNC: 0.5 MG/DL — SIGNIFICANT CHANGE UP (ref 0.2–1.2)
BUN SERPL-MCNC: 12 MG/DL — SIGNIFICANT CHANGE UP (ref 7–23)
CALCIUM SERPL-MCNC: 9.4 MG/DL — SIGNIFICANT CHANGE UP (ref 8.4–10.5)
CHLORIDE SERPL-SCNC: 98 MMOL/L — SIGNIFICANT CHANGE UP (ref 98–107)
CO2 SERPL-SCNC: 26 MMOL/L — SIGNIFICANT CHANGE UP (ref 22–31)
CREAT SERPL-MCNC: 0.88 MG/DL — SIGNIFICANT CHANGE UP (ref 0.5–1.3)
EOSINOPHIL # BLD AUTO: 0.23 K/UL — SIGNIFICANT CHANGE UP (ref 0–0.5)
EOSINOPHIL NFR BLD AUTO: 2.4 % — SIGNIFICANT CHANGE UP (ref 0–6)
GLUCOSE SERPL-MCNC: 103 MG/DL — HIGH (ref 70–99)
HCT VFR BLD CALC: 43.3 % — SIGNIFICANT CHANGE UP (ref 39–50)
HGB BLD-MCNC: 14.7 G/DL — SIGNIFICANT CHANGE UP (ref 13–17)
IMM GRANULOCYTES # BLD AUTO: 0.03 # — SIGNIFICANT CHANGE UP
IMM GRANULOCYTES NFR BLD AUTO: 0.3 % — SIGNIFICANT CHANGE UP (ref 0–1.5)
LIDOCAIN IGE QN: 27 U/L — SIGNIFICANT CHANGE UP (ref 7–60)
LYMPHOCYTES # BLD AUTO: 1.29 K/UL — SIGNIFICANT CHANGE UP (ref 1–3.3)
LYMPHOCYTES # BLD AUTO: 13.3 % — SIGNIFICANT CHANGE UP (ref 13–44)
MCHC RBC-ENTMCNC: 30.9 PG — SIGNIFICANT CHANGE UP (ref 27–34)
MCHC RBC-ENTMCNC: 33.9 % — SIGNIFICANT CHANGE UP (ref 32–36)
MCV RBC AUTO: 91 FL — SIGNIFICANT CHANGE UP (ref 80–100)
MONOCYTES # BLD AUTO: 0.86 K/UL — SIGNIFICANT CHANGE UP (ref 0–0.9)
MONOCYTES NFR BLD AUTO: 8.8 % — SIGNIFICANT CHANGE UP (ref 2–14)
NEUTROPHILS # BLD AUTO: 7.25 K/UL — SIGNIFICANT CHANGE UP (ref 1.8–7.4)
NEUTROPHILS NFR BLD AUTO: 74.6 % — SIGNIFICANT CHANGE UP (ref 43–77)
NRBC # FLD: 0 — SIGNIFICANT CHANGE UP
PLATELET # BLD AUTO: 186 K/UL — SIGNIFICANT CHANGE UP (ref 150–400)
PMV BLD: 11.3 FL — SIGNIFICANT CHANGE UP (ref 7–13)
POTASSIUM SERPL-MCNC: 4.5 MMOL/L — SIGNIFICANT CHANGE UP (ref 3.5–5.3)
POTASSIUM SERPL-SCNC: 4.5 MMOL/L — SIGNIFICANT CHANGE UP (ref 3.5–5.3)
PROT SERPL-MCNC: 7.5 G/DL — SIGNIFICANT CHANGE UP (ref 6–8.3)
RBC # BLD: 4.76 M/UL — SIGNIFICANT CHANGE UP (ref 4.2–5.8)
RBC # FLD: 11.5 % — SIGNIFICANT CHANGE UP (ref 10.3–14.5)
SODIUM SERPL-SCNC: 137 MMOL/L — SIGNIFICANT CHANGE UP (ref 135–145)
WBC # BLD: 9.72 K/UL — SIGNIFICANT CHANGE UP (ref 3.8–10.5)
WBC # FLD AUTO: 9.72 K/UL — SIGNIFICANT CHANGE UP (ref 3.8–10.5)

## 2018-08-20 PROCEDURE — 99232 SBSQ HOSP IP/OBS MODERATE 35: CPT

## 2018-08-20 RX ORDER — SIMETHICONE 80 MG/1
80 TABLET, CHEWABLE ORAL THREE TIMES A DAY
Qty: 0 | Refills: 0 | Status: DISCONTINUED | OUTPATIENT
Start: 2018-08-20 | End: 2018-08-21

## 2018-08-20 RX ORDER — ACETAMINOPHEN 500 MG
650 TABLET ORAL ONCE
Qty: 0 | Refills: 0 | Status: COMPLETED | OUTPATIENT
Start: 2018-08-20 | End: 2018-08-20

## 2018-08-20 RX ORDER — ACETAMINOPHEN 500 MG
650 TABLET ORAL EVERY 6 HOURS
Qty: 0 | Refills: 0 | Status: DISCONTINUED | OUTPATIENT
Start: 2018-08-20 | End: 2018-08-21

## 2018-08-20 RX ADMIN — SIMETHICONE 80 MILLIGRAM(S): 80 TABLET, CHEWABLE ORAL at 12:35

## 2018-08-20 RX ADMIN — HALOPERIDOL DECANOATE 5 MILLIGRAM(S): 100 INJECTION INTRAMUSCULAR at 23:38

## 2018-08-20 RX ADMIN — Medication 650 MILLIGRAM(S): at 13:35

## 2018-08-20 RX ADMIN — Medication 650 MILLIGRAM(S): at 10:30

## 2018-08-20 RX ADMIN — Medication 30 MILLILITER(S): at 04:55

## 2018-08-20 RX ADMIN — SIMETHICONE 80 MILLIGRAM(S): 80 TABLET, CHEWABLE ORAL at 19:27

## 2018-08-20 RX ADMIN — SIMETHICONE 80 MILLIGRAM(S): 80 TABLET, CHEWABLE ORAL at 23:38

## 2018-08-20 RX ADMIN — Medication 650 MILLIGRAM(S): at 21:18

## 2018-08-20 RX ADMIN — Medication 30 MILLILITER(S): at 11:11

## 2018-08-20 RX ADMIN — Medication 650 MILLIGRAM(S): at 12:35

## 2018-08-20 RX ADMIN — Medication 650 MILLIGRAM(S): at 04:41

## 2018-08-20 RX ADMIN — Medication 50 MILLIGRAM(S): at 21:18

## 2018-08-20 RX ADMIN — RISPERIDONE 4 MILLIGRAM(S): 4 TABLET ORAL at 20:53

## 2018-08-21 ENCOUNTER — INPATIENT (INPATIENT)
Facility: HOSPITAL | Age: 28
LOS: 2 days | Discharge: PSYCHIATRIC OR SUBSTANCE USE FACILITY | End: 2018-08-24
Attending: SURGERY | Admitting: SURGERY
Payer: MEDICAID

## 2018-08-21 VITALS
HEART RATE: 64 BPM | OXYGEN SATURATION: 99 % | SYSTOLIC BLOOD PRESSURE: 146 MMHG | RESPIRATION RATE: 16 BRPM | TEMPERATURE: 99 F | DIASTOLIC BLOOD PRESSURE: 98 MMHG

## 2018-08-21 VITALS — TEMPERATURE: 99 F | DIASTOLIC BLOOD PRESSURE: 75 MMHG | SYSTOLIC BLOOD PRESSURE: 117 MMHG

## 2018-08-21 LAB
ALBUMIN SERPL ELPH-MCNC: 4.3 G/DL — SIGNIFICANT CHANGE UP (ref 3.3–5)
ALP SERPL-CCNC: 58 U/L — SIGNIFICANT CHANGE UP (ref 40–120)
ALT FLD-CCNC: 27 U/L — SIGNIFICANT CHANGE UP (ref 4–41)
APPEARANCE UR: CLEAR — SIGNIFICANT CHANGE UP
APTT BLD: 27.7 SEC — SIGNIFICANT CHANGE UP (ref 27.5–37.4)
AST SERPL-CCNC: 23 U/L — SIGNIFICANT CHANGE UP (ref 4–40)
BASOPHILS # BLD AUTO: 0.07 K/UL — SIGNIFICANT CHANGE UP (ref 0–0.2)
BASOPHILS NFR BLD AUTO: 0.8 % — SIGNIFICANT CHANGE UP (ref 0–2)
BILIRUB SERPL-MCNC: 0.8 MG/DL — SIGNIFICANT CHANGE UP (ref 0.2–1.2)
BILIRUB UR-MCNC: NEGATIVE — SIGNIFICANT CHANGE UP
BLD GP AB SCN SERPL QL: NEGATIVE — SIGNIFICANT CHANGE UP
BLOOD UR QL VISUAL: NEGATIVE — SIGNIFICANT CHANGE UP
BUN SERPL-MCNC: 13 MG/DL — SIGNIFICANT CHANGE UP (ref 7–23)
CALCIUM SERPL-MCNC: 9.5 MG/DL — SIGNIFICANT CHANGE UP (ref 8.4–10.5)
CHLORIDE SERPL-SCNC: 97 MMOL/L — LOW (ref 98–107)
CO2 SERPL-SCNC: 27 MMOL/L — SIGNIFICANT CHANGE UP (ref 22–31)
COLOR SPEC: SIGNIFICANT CHANGE UP
CREAT SERPL-MCNC: 0.83 MG/DL — SIGNIFICANT CHANGE UP (ref 0.5–1.3)
EOSINOPHIL # BLD AUTO: 0.27 K/UL — SIGNIFICANT CHANGE UP (ref 0–0.5)
EOSINOPHIL NFR BLD AUTO: 3.1 % — SIGNIFICANT CHANGE UP (ref 0–6)
GLUCOSE SERPL-MCNC: 98 MG/DL — SIGNIFICANT CHANGE UP (ref 70–99)
GLUCOSE UR-MCNC: NEGATIVE — SIGNIFICANT CHANGE UP
HCT VFR BLD CALC: 44.8 % — SIGNIFICANT CHANGE UP (ref 39–50)
HGB BLD-MCNC: 15.2 G/DL — SIGNIFICANT CHANGE UP (ref 13–17)
IMM GRANULOCYTES # BLD AUTO: 0.03 # — SIGNIFICANT CHANGE UP
IMM GRANULOCYTES NFR BLD AUTO: 0.3 % — SIGNIFICANT CHANGE UP (ref 0–1.5)
INR BLD: 1.13 — SIGNIFICANT CHANGE UP (ref 0.88–1.17)
KETONES UR-MCNC: NEGATIVE — SIGNIFICANT CHANGE UP
LEUKOCYTE ESTERASE UR-ACNC: NEGATIVE — SIGNIFICANT CHANGE UP
LIDOCAIN IGE QN: 32.6 U/L — SIGNIFICANT CHANGE UP (ref 7–60)
LYMPHOCYTES # BLD AUTO: 2.4 K/UL — SIGNIFICANT CHANGE UP (ref 1–3.3)
LYMPHOCYTES # BLD AUTO: 27.6 % — SIGNIFICANT CHANGE UP (ref 13–44)
MCHC RBC-ENTMCNC: 31.4 PG — SIGNIFICANT CHANGE UP (ref 27–34)
MCHC RBC-ENTMCNC: 33.9 % — SIGNIFICANT CHANGE UP (ref 32–36)
MCV RBC AUTO: 92.6 FL — SIGNIFICANT CHANGE UP (ref 80–100)
MONOCYTES # BLD AUTO: 0.99 K/UL — HIGH (ref 0–0.9)
MONOCYTES NFR BLD AUTO: 11.4 % — SIGNIFICANT CHANGE UP (ref 2–14)
NEUTROPHILS # BLD AUTO: 4.93 K/UL — SIGNIFICANT CHANGE UP (ref 1.8–7.4)
NEUTROPHILS NFR BLD AUTO: 56.8 % — SIGNIFICANT CHANGE UP (ref 43–77)
NITRITE UR-MCNC: NEGATIVE — SIGNIFICANT CHANGE UP
NRBC # FLD: 0 — SIGNIFICANT CHANGE UP
PH UR: 7 — SIGNIFICANT CHANGE UP (ref 5–8)
PLATELET # BLD AUTO: 212 K/UL — SIGNIFICANT CHANGE UP (ref 150–400)
PMV BLD: 10.5 FL — SIGNIFICANT CHANGE UP (ref 7–13)
POTASSIUM SERPL-MCNC: 4.2 MMOL/L — SIGNIFICANT CHANGE UP (ref 3.5–5.3)
POTASSIUM SERPL-SCNC: 4.2 MMOL/L — SIGNIFICANT CHANGE UP (ref 3.5–5.3)
PROT SERPL-MCNC: 8.3 G/DL — SIGNIFICANT CHANGE UP (ref 6–8.3)
PROT UR-MCNC: NEGATIVE — SIGNIFICANT CHANGE UP
PROTHROM AB SERPL-ACNC: 13 SEC — SIGNIFICANT CHANGE UP (ref 9.8–13.1)
RBC # BLD: 4.84 M/UL — SIGNIFICANT CHANGE UP (ref 4.2–5.8)
RBC # FLD: 11.6 % — SIGNIFICANT CHANGE UP (ref 10.3–14.5)
RH IG SCN BLD-IMP: POSITIVE — SIGNIFICANT CHANGE UP
SODIUM SERPL-SCNC: 138 MMOL/L — SIGNIFICANT CHANGE UP (ref 135–145)
SP GR SPEC: 1.01 — SIGNIFICANT CHANGE UP (ref 1–1.04)
UROBILINOGEN FLD QL: NORMAL — SIGNIFICANT CHANGE UP
WBC # BLD: 8.69 K/UL — SIGNIFICANT CHANGE UP (ref 3.8–10.5)
WBC # FLD AUTO: 8.69 K/UL — SIGNIFICANT CHANGE UP (ref 3.8–10.5)

## 2018-08-21 PROCEDURE — 74177 CT ABD & PELVIS W/CONTRAST: CPT | Mod: 26

## 2018-08-21 PROCEDURE — 99223 1ST HOSP IP/OBS HIGH 75: CPT

## 2018-08-21 PROCEDURE — 99232 SBSQ HOSP IP/OBS MODERATE 35: CPT

## 2018-08-21 RX ORDER — MORPHINE SULFATE 50 MG/1
4 CAPSULE, EXTENDED RELEASE ORAL ONCE
Qty: 0 | Refills: 0 | Status: DISCONTINUED | OUTPATIENT
Start: 2018-08-21 | End: 2018-08-21

## 2018-08-21 RX ADMIN — MORPHINE SULFATE 4 MILLIGRAM(S): 50 CAPSULE, EXTENDED RELEASE ORAL at 18:50

## 2018-08-21 RX ADMIN — Medication 30 MILLILITER(S): at 10:11

## 2018-08-21 RX ADMIN — Medication 650 MILLIGRAM(S): at 14:49

## 2018-08-21 RX ADMIN — Medication 30 MILLILITER(S): at 14:49

## 2018-08-21 RX ADMIN — Medication 650 MILLIGRAM(S): at 13:48

## 2018-08-21 RX ADMIN — SIMETHICONE 80 MILLIGRAM(S): 80 TABLET, CHEWABLE ORAL at 07:35

## 2018-08-21 NOTE — ED ADULT NURSE NOTE - OBJECTIVE STATEMENT
pt received to intake #3 with c/o Luq pain radiating to the back. pt c/o burning with urination and headache. pt from [facility], aide at bedside. IV placed, labs drawn and sent. UA/UC obtained and sent. drinking contract, pending CT. pt to RW.

## 2018-08-21 NOTE — ED PROVIDER NOTE - MEDICAL DECISION MAKING DETAILS
Pt is a 29 y/o M PMHx psychosis p/w abdominal pain x 3 days -- r/o appendicitis, possible biliary colic, possible renal colic, possible UTI -- labs, ua, ucx, lipase, CT abd and pelvis

## 2018-08-21 NOTE — CHART NOTE - NSCHARTNOTEFT_GEN_A_CORE
[facility] Inpatient to ED Transfer Summary    Reason for Transfer/Medical Summary: 28 year old Montenegrin SPEAKING man with schizophrenia, admitted to [facility] with psychotic decompensation, who has been having severe abdominal pain for last 2-3 days, reporting unable to tolerate PO intake, concern for appendicitis.  Assessed by [facility] Medicine Dr. Patel who recommended transfer to ED.      PAST MEDICAL & SURGICAL HISTORY:      Allergies    No Known Allergies    Intolerances        MEDICATIONS  (STANDING):  risperiDONE   Tablet 4 milliGRAM(s) Oral at bedtime    MEDICATIONS  (PRN):  acetaminophen   Tablet. 650 milliGRAM(s) Oral every 6 hours PRN Moderate Pain (4 - 6)  aluminum hydroxide/magnesium hydroxide/simethicone Suspension 30 milliLiter(s) Oral every 4 hours PRN Dyspepsia  diphenhydrAMINE   Capsule 50 milliGRAM(s) Oral every 4 hours PRN agitation  diphenhydrAMINE   Injectable 50 milliGRAM(s) IntraMuscular once PRN agitation  haloperidol     Tablet 5 milliGRAM(s) Oral every 4 hours PRN agitation  haloperidol    Injectable 5 milliGRAM(s) IntraMuscular once PRN agitation  simethicone 80 milliGRAM(s) Chew three times a day PRN Gas      Vital Signs Last 24 Hrs  T(C): 37.3 (21 Aug 2018 07:55), Max: 37.3 (21 Aug 2018 07:55)  T(F): 99.1 (21 Aug 2018 07:55), Max: 99.1 (21 Aug 2018 07:55)  HR: --  BP: 117/75 (21 Aug 2018 07:55) (117/75 - 117/75)  BP(mean): 76 (21 Aug 2018 07:55) (76 - 76)  RR: --  SpO2: --  CAPILLARY BLOOD GLUCOSE            PHYSICAL EXAM:  GENERAL: Pt sitting in chair, appears to be in distress, but is communicative.  ABDOMEN: RUQ pain on light palpation, guarding present.    PSYCH: was responding to internal stimuli (talking to self) when interviewer entered room.    LABS:                        14.7   9.72  )-----------( 186      ( 20 Aug 2018 15:06 )             43.3     08-20    137  |  98  |  12  ----------------------------<  103<H>  4.5   |  26  |  0.88    Ca    9.4      20 Aug 2018 15:06    TPro  7.5  /  Alb  4.1  /  TBili  0.5  /  DBili  x   /  AST  20  /  ALT  26  /  AlkPhos  57  08-20              Psychiatry Section:  Psychiatric Summary/[facility] admitting diagnosis: 28 year old man with Schizophrenia, admitted to [facility] for psychosis.    Psychiatric Recommendations:  1. Continue risperidone 4 mg qhs  2. Haldol 5 mg PO/IM/IV q6h PRN agitation, Lorazepam 2 mg PO/IM/IV q6h PRN agitation, Benadryl 50 mg PO/IM/IV q6h PRN EPS/agitation    Observation status (check one):   (X) Constant Observation while in ED and if admitted  ( ) Enhanced care  ( ) Routine checks    Risk Status (check all that apply if present):  ( ) at risk for suicide/self-injury  ( ) at risk for aggressive behavior  ( ) at risk for elopement  (X ) other risk: Inability to safely care for self due to psychotic decompensation

## 2018-08-21 NOTE — ED ADULT TRIAGE NOTE - CHIEF COMPLAINT QUOTE
PMH psychosis, schizophrenia. Pt presents from [facility] for lumbar pain radiating to his abdomen along with a strong odor to his urine.  Pt currently calm and cooperative

## 2018-08-21 NOTE — CONSULT NOTE ADULT - SUBJECTIVE AND OBJECTIVE BOX
HPI:     PAST MEDICAL & SURGICAL HISTORY:      Review of Systems:   CONSTITUTIONAL: No fever, weight loss, or fatigue  EYES: No eye pain, visual disturbances, or discharge  ENMT:  No difficulty hearing, tinnitus, vertigo; No sinus or throat pain  NECK: No pain or stiffness  RESPIRATORY: No cough, wheezing, chills or hemoptysis; No shortness of breath  CARDIOVASCULAR: No chest pain, palpitations, dizziness, or leg swelling  GASTROINTESTINAL: No abdominal or epigastric pain. No nausea, vomiting, or hematemesis; No diarrhea or constipation. No melena or hematochezia.  GENITOURINARY: No dysuria, frequency, hematuria, or incontinence  NEUROLOGICAL: No headaches, memory loss, loss of strength, numbness, or tremors  SKIN: No itching, burning, rashes, or lesions   LYMPH NODES: No enlarged glands  ENDOCRINE: No heat or cold intolerance; No hair loss  MUSCULOSKELETAL: No joint pain or swelling; No muscle, back, or extremity pain  HEME/LYMPH: No easy bruising, or bleeding gums  ALLERY AND IMMUNOLOGIC: No hives or eczema    Allergies    No Known Allergies    Intolerances        Social History:     FAMILY HISTORY:      MEDICATIONS  (STANDING):  risperiDONE   Tablet 4 milliGRAM(s) Oral at bedtime    MEDICATIONS  (PRN):  acetaminophen   Tablet. 650 milliGRAM(s) Oral every 6 hours PRN Moderate Pain (4 - 6)  aluminum hydroxide/magnesium hydroxide/simethicone Suspension 30 milliLiter(s) Oral every 4 hours PRN Dyspepsia  diphenhydrAMINE   Capsule 50 milliGRAM(s) Oral every 4 hours PRN agitation  diphenhydrAMINE   Injectable 50 milliGRAM(s) IntraMuscular once PRN agitation  haloperidol     Tablet 5 milliGRAM(s) Oral every 4 hours PRN agitation  haloperidol    Injectable 5 milliGRAM(s) IntraMuscular once PRN agitation  simethicone 80 milliGRAM(s) Chew three times a day PRN Gas      Vital Signs Last 24 Hrs  T(C): 37.3 (21 Aug 2018 07:55), Max: 37.3 (21 Aug 2018 07:55)  T(F): 99.1 (21 Aug 2018 07:55), Max: 99.1 (21 Aug 2018 07:55)  HR: --  BP: 117/75 (21 Aug 2018 07:55) (117/75 - 117/75)  BP(mean): 76 (21 Aug 2018 07:55) (76 - 76)  RR: --  SpO2: --  CAPILLARY BLOOD GLUCOSE            PHYSICAL EXAM:  GENERAL: NAD, well-developed  HEAD:  Atraumatic, Normocephalic  EYES: EOMI, conjunctiva and sclera clear  NECK: Supple, No JVD  CHEST/LUNG: Clear to auscultation bilaterally; No wheeze  HEART: Regular rate and rhythm; No murmurs, rubs, or gallops  ABDOMEN: Soft, Nontender, Nondistended; Bowel sounds present  EXTREMITIES:  2+ Peripheral Pulses, No clubbing, cyanosis, or edema  NEUROLOGY: non-focal  SKIN: No rashes or lesions    LABS:                        14.7   9.72  )-----------( 186      ( 20 Aug 2018 15:06 )             43.3     08-20    137  |  98  |  12  ----------------------------<  103<H>  4.5   |  26  |  0.88    Ca    9.4      20 Aug 2018 15:06    TPro  7.5  /  Alb  4.1  /  TBili  0.5  /  DBili  x   /  AST  20  /  ALT  26  /  AlkPhos  57  08-20              EKG(personally reviewed):    RADIOLOGY & ADDITIONAL TESTS:    Imaging Personally Reviewed:    Consultant(s) Notes Reviewed:      Care Discussed with Consultants/Other Providers: HPI: 28M admitted to [facility] 8/3/318 for managemnet of psychosis. Patient reports three days of abd pain radiating to back.  Some prn medications hgave reportedly helped but today he is writihing in pain, says he is not hungry and can't eat due to pain.      PAST MEDICAL & SURGICAL HISTORY:  none    Review of Systems:   CONSTITUTIONAL: No fever, weight loss, or fatigue  EYES: No eye pain, visual disturbances, or discharge  ENMT:  No difficulty hearing, tinnitus, vertigo; No sinus or throat pain  NECK: No pain or stiffness  RESPIRATORY: No cough, wheezing, chills or hemoptysis; No shortness of breath  CARDIOVASCULAR: No chest pain, palpitations, dizziness, or leg swelling  GASTROINTESTINAL: see HPI   GENITOURINARY: No dysuria, frequency, hematuria, or incontinence  NEUROLOGICAL: No headaches, memory loss, loss of strength, numbness, or tremors  SKIN: No itching, burning, rashes, or lesions   LYMPH NODES: No enlarged glands  ENDOCRINE: No heat or cold intolerance; No hair loss  MUSCULOSKELETAL: No joint pain or swelling; No muscle, back, or extremity pain  HEME/LYMPH: No easy bruising, or bleeding gums  ALLERY AND IMMUNOLOGIC: No hives or eczema    Allergies    No Known Allergies    Intolerances        Social History: denies etoh tob idu    FAMILY HISTORY:noncontributory      MEDICATIONS  (STANDING):  risperiDONE   Tablet 4 milliGRAM(s) Oral at bedtime    MEDICATIONS  (PRN):  acetaminophen   Tablet. 650 milliGRAM(s) Oral every 6 hours PRN Moderate Pain (4 - 6)  aluminum hydroxide/magnesium hydroxide/simethicone Suspension 30 milliLiter(s) Oral every 4 hours PRN Dyspepsia  diphenhydrAMINE   Capsule 50 milliGRAM(s) Oral every 4 hours PRN agitation  diphenhydrAMINE   Injectable 50 milliGRAM(s) IntraMuscular once PRN agitation  haloperidol     Tablet 5 milliGRAM(s) Oral every 4 hours PRN agitation  haloperidol    Injectable 5 milliGRAM(s) IntraMuscular once PRN agitation  simethicone 80 milliGRAM(s) Chew three times a day PRN Gas      Vital Signs Last 24 Hrs  T(C): 37.3 (21 Aug 2018 07:55), Max: 37.3 (21 Aug 2018 07:55)  T(F): 99.1 (21 Aug 2018 07:55), Max: 99.1 (21 Aug 2018 07:55)  HR: 76  BP: 117/75 (21 Aug 2018 07:55) (117/75 - 117/75)  BP(mean): 76 (21 Aug 2018 07:55) (76 - 76)  RR: 15  SpO2: --  CAPILLARY BLOOD GLUCOSE            PHYSICAL EXAM:  GENERAL: NAD, well-developed  HEAD:  Atraumatic, Normocephalic  EYES: EOMI, conjunctiva and sclera clear  NECK: Supple, No JVD  CHEST/LUNG: Clear to auscultation bilaterally; No wheeze  HEART: Regular rate and rhythm; No murmurs, rubs, or gallops  ABDOMEN: + TTP RLQ with guarding, will not relax stomach muscles; bowel sounds not appreciated  EXTREMITIES:  2+ Peripheral Pulses, No clubbing, cyanosis, or edema  NEUROLOGY: non-focal  SKIN: No rashes or lesions    LABS:                        14.7   9.72  )-----------( 186      ( 20 Aug 2018 15:06 )             43.3     08-20    137  |  98  |  12  ----------------------------<  103<H>  4.5   |  26  |  0.88    Ca    9.4      20 Aug 2018 15:06    TPro  7.5  /  Alb  4.1  /  TBili  0.5  /  DBili  x   /  AST  20  /  ALT  26  /  AlkPhos  57  08-20              EKG(personally reviewed):    RADIOLOGY & ADDITIONAL TESTS:    Imaging Personally Reviewed:    Consultant(s) Notes Reviewed:      Care Discussed with Consultants/Other Providers:

## 2018-08-21 NOTE — ED ADULT NURSE NOTE - NSIMPLEMENTINTERV_GEN_ALL_ED
Implemented All [place] Safety Interventions:  [place] to call system. Call bell, personal items and telephone within reach. Instruct patient to call for assistance. Room bathroom lighting operational. Non-slip footwear when patient is off stretcher. Physically safe environment: no spills, clutter or unnecessary equipment. Stretcher in lowest position, wheels locked, appropriate side rails in place.

## 2018-08-21 NOTE — ED ADULT NURSE NOTE - CHIEF COMPLAINT QUOTE
PMH psychosis, schizophrenia. Pt presents from [facility] for lumbar pain radiating to his abdomen along with a strong odor to his urine.  Pt currently calm and cooperative

## 2018-08-21 NOTE — ED PROVIDER NOTE - OBJECTIVE STATEMENT
Pt is a 29 y/o M PMHx psychosis p/w abdominal pain x 3 days.  Pt speaks Citizen of [place], pacific  ID# [id number] used.  Pt states he has had three days of intermittent right sided abdominal and right flank pain which worsens when he hears people talking.  Pt notes associated nausea without vomiting.  Pt notes he had mild testicular pain yesterday, but none today.  Pt also notes foul smelling urine.  As per EMR, pt sent to ED to r/o appendicitis.  Pt denies any diarrhea, constipation, dysuria, cloudy urine, vomiting.

## 2018-08-21 NOTE — ED PROVIDER NOTE - PROGRESS NOTE DETAILS
PA STONE:  CT shows possible alena appendicitis.  General surgery consulted at this time. [place]: LAbs grossly wnl. CT possible early appy. Pt still w/ lower abd pain. On exam has minimal LLQ ttp, no RLQ ttp. soft, no rebound/guarding. Awaiting surgery reassessment. [place]: On exam has suprapubic/rlq ttp. Exam seems slighlty migratory. given persistent ttp and ct read of early appy and psych issues, pt likely needs admission for serial exams. d/w dr. horvath directly who will review scan and then get back to us.

## 2018-08-21 NOTE — ED PROVIDER NOTE - ATTENDING CONTRIBUTION TO CARE
agree with PA note  28 yr old Kyrgyz speaking male recently admitted to [facility] for new onset psychosis presents 3 days of abdominal pain.  Points to RLQ and LLQ.  States mild fever.  Denies urinary symptoms or fever    PE: well appearing; VSS; CTAB/L; s 1s2 no m/r/g abd: mild lower abdominal tenderness ext: no edema

## 2018-08-22 ENCOUNTER — TRANSCRIPTION ENCOUNTER (OUTPATIENT)
Age: 28
End: 2018-08-22

## 2018-08-22 DIAGNOSIS — K37 UNSPECIFIED APPENDICITIS: ICD-10-CM

## 2018-08-22 PROCEDURE — 99222 1ST HOSP IP/OBS MODERATE 55: CPT

## 2018-08-22 RX ORDER — ENOXAPARIN SODIUM 100 MG/ML
40 INJECTION SUBCUTANEOUS EVERY 24 HOURS
Qty: 0 | Refills: 0 | Status: DISCONTINUED | OUTPATIENT
Start: 2018-08-22 | End: 2018-08-24

## 2018-08-22 RX ORDER — QUETIAPINE FUMARATE 200 MG/1
25 TABLET, FILM COATED ORAL EVERY 6 HOURS
Qty: 0 | Refills: 0 | Status: DISCONTINUED | OUTPATIENT
Start: 2018-08-22 | End: 2018-08-24

## 2018-08-22 RX ORDER — SENNA PLUS 8.6 MG/1
2 TABLET ORAL AT BEDTIME
Qty: 0 | Refills: 0 | Status: DISCONTINUED | OUTPATIENT
Start: 2018-08-22 | End: 2018-08-24

## 2018-08-22 RX ORDER — POLYETHYLENE GLYCOL 3350 17 G/17G
17 POWDER, FOR SOLUTION ORAL ONCE
Qty: 0 | Refills: 0 | Status: COMPLETED | OUTPATIENT
Start: 2018-08-22 | End: 2018-08-22

## 2018-08-22 RX ORDER — DIPHENHYDRAMINE HCL 50 MG
50 CAPSULE ORAL EVERY 4 HOURS
Qty: 0 | Refills: 0 | Status: DISCONTINUED | OUTPATIENT
Start: 2018-08-22 | End: 2018-08-24

## 2018-08-22 RX ORDER — SODIUM CHLORIDE 9 MG/ML
1000 INJECTION INTRAMUSCULAR; INTRAVENOUS; SUBCUTANEOUS ONCE
Qty: 0 | Refills: 0 | Status: COMPLETED | OUTPATIENT
Start: 2018-08-22 | End: 2018-08-22

## 2018-08-22 RX ORDER — PIPERACILLIN AND TAZOBACTAM 4; .5 G/20ML; G/20ML
3.38 INJECTION, POWDER, LYOPHILIZED, FOR SOLUTION INTRAVENOUS ONCE
Qty: 0 | Refills: 0 | Status: COMPLETED | OUTPATIENT
Start: 2018-08-22 | End: 2018-08-22

## 2018-08-22 RX ORDER — SODIUM CHLORIDE 9 MG/ML
1000 INJECTION, SOLUTION INTRAVENOUS
Qty: 0 | Refills: 0 | Status: DISCONTINUED | OUTPATIENT
Start: 2018-08-22 | End: 2018-08-23

## 2018-08-22 RX ORDER — HALOPERIDOL DECANOATE 100 MG/ML
2 INJECTION INTRAMUSCULAR EVERY 6 HOURS
Qty: 0 | Refills: 0 | Status: DISCONTINUED | OUTPATIENT
Start: 2018-08-22 | End: 2018-08-24

## 2018-08-22 RX ORDER — MORPHINE SULFATE 50 MG/1
2 CAPSULE, EXTENDED RELEASE ORAL ONCE
Qty: 0 | Refills: 0 | Status: DISCONTINUED | OUTPATIENT
Start: 2018-08-22 | End: 2018-08-23

## 2018-08-22 RX ORDER — HALOPERIDOL DECANOATE 100 MG/ML
5 INJECTION INTRAMUSCULAR
Qty: 0 | Refills: 0 | COMMUNITY

## 2018-08-22 RX ORDER — HALOPERIDOL DECANOATE 100 MG/ML
5 INJECTION INTRAMUSCULAR EVERY 6 HOURS
Qty: 0 | Refills: 0 | Status: DISCONTINUED | OUTPATIENT
Start: 2018-08-22 | End: 2018-08-23

## 2018-08-22 RX ORDER — ACETAMINOPHEN 500 MG
650 TABLET ORAL ONCE
Qty: 0 | Refills: 0 | Status: COMPLETED | OUTPATIENT
Start: 2018-08-22 | End: 2018-08-22

## 2018-08-22 RX ORDER — RISPERIDONE 4 MG/1
4 TABLET ORAL AT BEDTIME
Qty: 0 | Refills: 0 | Status: DISCONTINUED | OUTPATIENT
Start: 2018-08-22 | End: 2018-08-24

## 2018-08-22 RX ADMIN — SODIUM CHLORIDE 1000 MILLILITER(S): 9 INJECTION INTRAMUSCULAR; INTRAVENOUS; SUBCUTANEOUS at 05:17

## 2018-08-22 RX ADMIN — SODIUM CHLORIDE 1000 MILLILITER(S): 9 INJECTION INTRAMUSCULAR; INTRAVENOUS; SUBCUTANEOUS at 04:56

## 2018-08-22 RX ADMIN — Medication 650 MILLIGRAM(S): at 04:46

## 2018-08-22 RX ADMIN — ENOXAPARIN SODIUM 40 MILLIGRAM(S): 100 INJECTION SUBCUTANEOUS at 18:09

## 2018-08-22 RX ADMIN — SODIUM CHLORIDE 100 MILLILITER(S): 9 INJECTION, SOLUTION INTRAVENOUS at 19:50

## 2018-08-22 RX ADMIN — RISPERIDONE 4 MILLIGRAM(S): 4 TABLET ORAL at 21:39

## 2018-08-22 RX ADMIN — SENNA PLUS 2 TABLET(S): 8.6 TABLET ORAL at 21:39

## 2018-08-22 RX ADMIN — PIPERACILLIN AND TAZOBACTAM 200 GRAM(S): 4; .5 INJECTION, POWDER, LYOPHILIZED, FOR SOLUTION INTRAVENOUS at 04:49

## 2018-08-22 RX ADMIN — POLYETHYLENE GLYCOL 3350 17 GRAM(S): 17 POWDER, FOR SOLUTION ORAL at 17:18

## 2018-08-22 RX ADMIN — SODIUM CHLORIDE 100 MILLILITER(S): 9 INJECTION, SOLUTION INTRAVENOUS at 10:30

## 2018-08-22 NOTE — BEHAVIORAL HEALTH ASSESSMENT NOTE - RISK ASSESSMENT
pt is at acutely elevated risk of harm to self Risk factors include acute and chronic medical issues, multiple previous inpatient psychiatric hospitalizations, acute psychosis, history of substance abuse. Protective factors include social support, no past psych hosp or SA,   Pt denies SI/HI/AH/VH, manic or psychotic symptoms.

## 2018-08-22 NOTE — H&P ADULT - HISTORY OF PRESENT ILLNESS
HPI: 28 year old male presenting with 3 day history of severe abdominal pain, associated with anorexia and bloating. Patient states he's been unable to eat due to the pain. Denies eating anything unusual. Denies fevers, chills, emesis, change in bowel habits. Recently admitted at [facility] ([facility]) for schizophrenia with acute psychotic decompensation (admitted 8/3/18), transferred to [facility] for further assessment following the development of abdominal pain with concern for appendicitis. Patient reports pain on the right and left sides of his abdomen, and moderate midline discomfort.    PMHx: Psychosis (Schizophrenia)  PSHx: No significant past surgical history    Medications (inpatient): risperidone, haldol PRN, lorazepam PRN, benadryl PRN  Allergies: No Known Allergies  (Intolerances: None)  Social Hx: unable to obtain  Family Hx: unable to obtain    Physical exam significant for mild RUQ tenderness without rebound or guarding    Imaging and labs remarkable for mild appendiceal dilation (7mm) without leukocytosis

## 2018-08-22 NOTE — H&P ADULT - NSHPPHYSICALEXAM_GEN_ALL_CORE
Vital Signs  T(C): 37.1  HR: 56  BP: 103/55  RR: 17  SpO2: 97%    General: well developed, well nourished, NAD  Neuro: alert and oriented, no focal deficits, moves all extremities spontaneously  HEENT: NCAT, anicteric, mucosa moist  Respiratory: airway patent, respirations unlabored  CVS: regular rate and rhythm  Abdomen: softly distended, mild tenderness in RUQ without rebound or guarding  Extremities: no edema, sensation and movement grossly intact  Skin: warm, dry, appropriate color

## 2018-08-22 NOTE — BEHAVIORAL HEALTH ASSESSMENT NOTE - NSBHCONSULTRECOMMENDOTHER_PSY_A_CORE FT
Monitor QTC hold antipsychotic for QTC >500   5. Pt has capacity to sign consent appendectomy    PT return to [facility] safety and stabilization   Psychiatry will follow

## 2018-08-22 NOTE — BEHAVIORAL HEALTH ASSESSMENT NOTE - CASE SUMMARY
Pt is a 29 y/o Maori-speaking male, single, noncaregiver, lives alone, PHX schizophrenia, multiple inpatient hospitalizations, most recent [facility] for schizophrenia with acute psychotic decompensation (admitted 8/3/18), no known history of suicide attempts or violence, no known legal issues, no known PMH, past history of crack-cocaine and marijuana use per brother (today's urine tox is negative. Pt presents to [facility] transferred from [facility] c/o abdominal pain with concern for appendicitis. Psychiatry consulted for capacity for appendectomy.  Pt is s/p surgery and calm though repeats "mucho dolor."  He is refusing po meds and was encouraged to take Percocet for pain.  He may be transferred back to [facility] once he is off IV fluids and eating.  He may continue his Risperidone when he is able to take po meds.

## 2018-08-22 NOTE — BEHAVIORAL HEALTH ASSESSMENT NOTE - NSBHCHARTREVIEWLAB_PSY_A_CORE FT
CBC Full  -  ( 21 Aug 2018 18:32 )  WBC Count : 8.69 K/uL  Hemoglobin : 15.2 g/dL  Hematocrit : 44.8 %  Platelet Count - Automated : 212 K/uL  Mean Cell Volume : 92.6 fL  Mean Cell Hemoglobin : 31.4 pg  Mean Cell Hemoglobin Concentration : 33.9 %  Auto Neutrophil # : 4.93 K/uL  Auto Lymphocyte # : 2.40 K/uL  Auto Monocyte # : 0.99 K/uL  Auto Eosinophil # : 0.27 K/uL  Auto Basophil # : 0.07 K/uL  Auto Neutrophil % : 56.8 %  Auto Lymphocyte % : 27.6 %  Auto Monocyte % : 11.4 %  Auto Eosinophil % : 3.1 %  Auto Basophil % : 0.8 %  08-21    138  |  97<L>  |  13  ----------------------------<  98  4.2   |  27  |  0.83    Ca    9.5      21 Aug 2018 18:32    TPro  8.3  /  Alb  4.3  /  TBili  0.8  /  DBili  x   /  AST  23  /  ALT  27  /  AlkPhos  58  08-21
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
## 2018-08-22 NOTE — BEHAVIORAL HEALTH ASSESSMENT NOTE - SUMMARY
29 y/o Romanian-speaking male, single, noncaregiver, lives alone, unemployed, with unclear psychiatric history, possible history of unspecified psychosis per separate [facility] chart ED visit on 2/17/18 (Ramon Sandhu MR#[id number]) at [facility] at which time pt was admitted on 9.27 legal status (to unknown hospital, no records in CV), no other known psychiatric admissions, no current outpatient treatment, EMS run sheet indicates he is/was prescribed Risperdal and Cogentin (unknown doses), no known history of suicide attempts or violence, no known legal issues, no known PMH, past history of crack-cocaine and marijuana use per brother (today's urine tox is negative), BIBEMS activated by pt's brother due to agitation, paranoid delusions, and talking to himself in context of medication noncompliance for at least 1 month.    Patient agitated on arrival and currently presents with disorganized thought process, paranoid/somatic delusions. Per brother, pt agitated, paranoid, talking to himself, likely not sleeping or eating for several days. Pt presents acute danger to self and requires inpatient psychiatric admission for safety and stabilization. Pt is a 29 y/o Tajik-speaking male, single, noncaregiver, lives alone, PHX schizophrenia, multiple inpatient hospitalizations, most recent [facility] for schizophrenia with acute psychotic decompensation (admitted 8/3/18), no known history of suicide attempts or violence, no known legal issues, no known PMH, past history of crack-cocaine and marijuana use per brother (today's urine tox is negative. Pt presents to [facility] transferred from [facility] c/o abdominal pain with concern for appendicitis. Psychiatry consulted for capacity for appendectomy.     Seen and evaluated.  utilized access code [id number]. Pt a&ox3. Pt calm and cooperative with interview. Pt presents with flat affect, non spontaneous speech and poor eye contact. Pt denies current SI/HI, Denies audio/ visual/ tactile/ hallucinations . No PRNS received   Pt is able to verbalize understanding and appreciation of recommended procedure. Pt able to teach back risks and benefits of procedure/ Pt has capacity to sign consent for appendectomy.     Plan  1. Continue Risperdal 4mg qhs as ordered  2. PRN Seroquel 25mg q 6hr for agitation  3. PRN haldol 2mg IV/IM q6hr severe agitation   4. Monitor QTC hold antipsychotic for QTC >500   5. Pt has capacity to sign consent appendectomy    PT return to [facility] safety and stabilization   Psychiatry will follow

## 2018-08-22 NOTE — H&P ADULT - ASSESSMENT
28 year old male with several days of abdominal pain, mild appendiceal thickening, concern for appendicitis.    - admit to surgery  - maintain NPO  - serial abdominal exams in setting of unreliable patient  - defer antibiotics at this time    Discussed with Dr. Drummond  --JANUARY Whitney, [id number]

## 2018-08-22 NOTE — H&P ADULT - NSHPLABSRESULTS_GEN_ALL_CORE
15.2   8.69  )-----------( 212      ( 21 Aug 2018 18:32 )             44.8       138  |  97<L>  |  13  ----------------------------<  98  4.2   |  27  |  0.83    Ca    9.5      21 Aug 2018 18:32    TPro  8.3  /  Alb  4.3  /  TBili  0.8  /  DBili  x   /  AST  23  /  ALT  27  /  AlkPhos  58    Urinalysis Basic - ( 21 Aug 2018 18:32 )    Color: LIGHT YELLOW / Appearance: CLEAR / S.012 / pH: 7.0  Gluc: NEGATIVE / Ketone: NEGATIVE  / Bili: NEGATIVE / Urobili: NORMAL   Blood: NEGATIVE / Protein: NEGATIVE / Nitrite: NEGATIVE   Leuk Esterase: NEGATIVE / RBC: x / WBC x   Sq Epi: x / Non Sq Epi: x / Bacteria: x    PT/INR - ( 21 Aug 2018 18:32 )   PT: 13.0 SEC;   INR: 1.13          PTT - ( 21 Aug 2018 18:32 )  PTT:27.7 SEC    < from: CT Abdomen and Pelvis w/ Oral Cont and w/ IV Cont (18 @ 21:56) >    IMPRESSION: No nephroureterolithiasis or hydroureteronephrosis.  Borderline enlargement of the tip of the appendix. This may indicate early appendicitis. Please correlate with clinical findings.    < end of copied text >

## 2018-08-22 NOTE — BEHAVIORAL HEALTH ASSESSMENT NOTE - NSBHCHARTREVIEWINVESTIGATE_PSY_A_CORE FT
Ventricular Rate 48 BPM    Atrial Rate 48 BPM    P-R Interval 156 ms    QRS Duration 86 ms    Q-T Interval 454 ms    QTC Calculation(Bezet) 405 ms    P Axis 29 degrees    R Axis 58 degrees    T Axis 34 degrees    Diagnosis Line Marked sinus bradycardia  Abnormal ECG

## 2018-08-22 NOTE — BEHAVIORAL HEALTH ASSESSMENT NOTE - NSBHCHARTREVIEWVS_PSY_A_CORE FT
Vital Signs Last 24 Hrs  T(C): 36.9 (22 Aug 2018 17:55), Max: 37.4 (22 Aug 2018 04:44)  T(F): 98.5 (22 Aug 2018 17:55), Max: 99.4 (22 Aug 2018 04:44)  HR: 62 (22 Aug 2018 17:55) (56 - 68)  BP: 112/62 (22 Aug 2018 17:55) (103/55 - 116/69)  BP(mean): --  RR: 17 (22 Aug 2018 17:55) (16 - 17)  SpO2: 96% (22 Aug 2018 17:55) (95% - 100%)

## 2018-08-23 ENCOUNTER — TRANSCRIPTION ENCOUNTER (OUTPATIENT)
Age: 28
End: 2018-08-23

## 2018-08-23 ENCOUNTER — RESULT REVIEW (OUTPATIENT)
Age: 28
End: 2018-08-23

## 2018-08-23 LAB
APTT BLD: 29.1 SEC — SIGNIFICANT CHANGE UP (ref 27.5–37.4)
BACTERIA UR CULT: SIGNIFICANT CHANGE UP
BASOPHILS # BLD AUTO: 0.06 K/UL — SIGNIFICANT CHANGE UP (ref 0–0.2)
BASOPHILS NFR BLD AUTO: 1.2 % — SIGNIFICANT CHANGE UP (ref 0–2)
BUN SERPL-MCNC: 10 MG/DL — SIGNIFICANT CHANGE UP (ref 7–23)
CALCIUM SERPL-MCNC: 9 MG/DL — SIGNIFICANT CHANGE UP (ref 8.4–10.5)
CHLORIDE SERPL-SCNC: 100 MMOL/L — SIGNIFICANT CHANGE UP (ref 98–107)
CO2 SERPL-SCNC: 25 MMOL/L — SIGNIFICANT CHANGE UP (ref 22–31)
CREAT SERPL-MCNC: 0.75 MG/DL — SIGNIFICANT CHANGE UP (ref 0.5–1.3)
EOSINOPHIL # BLD AUTO: 0.41 K/UL — SIGNIFICANT CHANGE UP (ref 0–0.5)
EOSINOPHIL NFR BLD AUTO: 7.9 % — HIGH (ref 0–6)
GLUCOSE SERPL-MCNC: 80 MG/DL — SIGNIFICANT CHANGE UP (ref 70–99)
HCT VFR BLD CALC: 41.3 % — SIGNIFICANT CHANGE UP (ref 39–50)
HGB BLD-MCNC: 13.7 G/DL — SIGNIFICANT CHANGE UP (ref 13–17)
IMM GRANULOCYTES # BLD AUTO: 0.01 # — SIGNIFICANT CHANGE UP
IMM GRANULOCYTES NFR BLD AUTO: 0.2 % — SIGNIFICANT CHANGE UP (ref 0–1.5)
INR BLD: 1.15 — SIGNIFICANT CHANGE UP (ref 0.88–1.17)
LYMPHOCYTES # BLD AUTO: 1.6 K/UL — SIGNIFICANT CHANGE UP (ref 1–3.3)
LYMPHOCYTES # BLD AUTO: 30.8 % — SIGNIFICANT CHANGE UP (ref 13–44)
MAGNESIUM SERPL-MCNC: 2 MG/DL — SIGNIFICANT CHANGE UP (ref 1.6–2.6)
MCHC RBC-ENTMCNC: 30.6 PG — SIGNIFICANT CHANGE UP (ref 27–34)
MCHC RBC-ENTMCNC: 33.2 % — SIGNIFICANT CHANGE UP (ref 32–36)
MCV RBC AUTO: 92.4 FL — SIGNIFICANT CHANGE UP (ref 80–100)
MONOCYTES # BLD AUTO: 0.52 K/UL — SIGNIFICANT CHANGE UP (ref 0–0.9)
MONOCYTES NFR BLD AUTO: 10 % — SIGNIFICANT CHANGE UP (ref 2–14)
NEUTROPHILS # BLD AUTO: 2.6 K/UL — SIGNIFICANT CHANGE UP (ref 1.8–7.4)
NEUTROPHILS NFR BLD AUTO: 49.9 % — SIGNIFICANT CHANGE UP (ref 43–77)
NRBC # FLD: 0 — SIGNIFICANT CHANGE UP
PHOSPHATE SERPL-MCNC: 3.2 MG/DL — SIGNIFICANT CHANGE UP (ref 2.5–4.5)
PLATELET # BLD AUTO: 214 K/UL — SIGNIFICANT CHANGE UP (ref 150–400)
PMV BLD: 10.5 FL — SIGNIFICANT CHANGE UP (ref 7–13)
POTASSIUM SERPL-MCNC: 4.2 MMOL/L — SIGNIFICANT CHANGE UP (ref 3.5–5.3)
POTASSIUM SERPL-SCNC: 4.2 MMOL/L — SIGNIFICANT CHANGE UP (ref 3.5–5.3)
PROTHROM AB SERPL-ACNC: 12.8 SEC — SIGNIFICANT CHANGE UP (ref 9.8–13.1)
RBC # BLD: 4.47 M/UL — SIGNIFICANT CHANGE UP (ref 4.2–5.8)
RBC # FLD: 11.2 % — SIGNIFICANT CHANGE UP (ref 10.3–14.5)
SODIUM SERPL-SCNC: 138 MMOL/L — SIGNIFICANT CHANGE UP (ref 135–145)
SPECIMEN SOURCE: SIGNIFICANT CHANGE UP
WBC # BLD: 5.2 K/UL — SIGNIFICANT CHANGE UP (ref 3.8–10.5)
WBC # FLD AUTO: 5.2 K/UL — SIGNIFICANT CHANGE UP (ref 3.8–10.5)

## 2018-08-23 PROCEDURE — 90792 PSYCH DIAG EVAL W/MED SRVCS: CPT

## 2018-08-23 PROCEDURE — 44970 LAPAROSCOPY APPENDECTOMY: CPT | Mod: GC

## 2018-08-23 PROCEDURE — 88304 TISSUE EXAM BY PATHOLOGIST: CPT | Mod: 26

## 2018-08-23 RX ORDER — FENTANYL CITRATE 50 UG/ML
25 INJECTION INTRAVENOUS
Qty: 0 | Refills: 0 | Status: DISCONTINUED | OUTPATIENT
Start: 2018-08-23 | End: 2018-08-23

## 2018-08-23 RX ORDER — ACETAMINOPHEN 500 MG
650 TABLET ORAL EVERY 6 HOURS
Qty: 0 | Refills: 0 | Status: DISCONTINUED | OUTPATIENT
Start: 2018-08-23 | End: 2018-08-24

## 2018-08-23 RX ORDER — ONDANSETRON 8 MG/1
4 TABLET, FILM COATED ORAL ONCE
Qty: 0 | Refills: 0 | Status: DISCONTINUED | OUTPATIENT
Start: 2018-08-23 | End: 2018-08-23

## 2018-08-23 RX ORDER — OXYCODONE HYDROCHLORIDE 5 MG/1
5 TABLET ORAL EVERY 4 HOURS
Qty: 0 | Refills: 0 | Status: DISCONTINUED | OUTPATIENT
Start: 2018-08-23 | End: 2018-08-24

## 2018-08-23 RX ADMIN — OXYCODONE HYDROCHLORIDE 5 MILLIGRAM(S): 5 TABLET ORAL at 14:00

## 2018-08-23 RX ADMIN — RISPERIDONE 4 MILLIGRAM(S): 4 TABLET ORAL at 22:59

## 2018-08-23 RX ADMIN — MORPHINE SULFATE 2 MILLIGRAM(S): 50 CAPSULE, EXTENDED RELEASE ORAL at 06:00

## 2018-08-23 RX ADMIN — OXYCODONE HYDROCHLORIDE 5 MILLIGRAM(S): 5 TABLET ORAL at 22:59

## 2018-08-23 RX ADMIN — OXYCODONE HYDROCHLORIDE 5 MILLIGRAM(S): 5 TABLET ORAL at 13:54

## 2018-08-23 RX ADMIN — MORPHINE SULFATE 2 MILLIGRAM(S): 50 CAPSULE, EXTENDED RELEASE ORAL at 05:31

## 2018-08-23 RX ADMIN — OXYCODONE HYDROCHLORIDE 5 MILLIGRAM(S): 5 TABLET ORAL at 23:30

## 2018-08-23 RX ADMIN — SENNA PLUS 2 TABLET(S): 8.6 TABLET ORAL at 22:59

## 2018-08-23 NOTE — PROGRESS NOTE ADULT - SUBJECTIVE AND OBJECTIVE BOX
SUBJECTIVE:    Patient stable overnight, no acute events.   C/o right abd pain.   No BM.   VSS, cleared by psych to give own consent.     OBJECTIVE:     ** VITAL SIGNS / I&O's **    T(C): 36.8 (08-23-18 @ 07:49), Max: 37.5 (08-22-18 @ 20:55)  T(F): 98.3 (08-23-18 @ 07:49), Max: 99.5 (08-22-18 @ 20:55)  HR: 67 (08-23-18 @ 07:49) (53 - 70)  BP: 116/66 (08-23-18 @ 07:49) (101/60 - 118/53)  RR: 16 (08-23-18 @ 07:49) (16 - 18)  SpO2: 96% (08-23-18 @ 07:49) (95% - 98%)      22 Aug 2018 07:01  -  23 Aug 2018 07:00  --------------------------------------------------------  IN:    lactated ringers.: 2100 mL    Oral Fluid: 100 mL  Total IN: 2200 mL    OUT:    Voided: 1550 mL  Total OUT: 1550 mL    Total NET: 650 mL          ** PHYSICAL EXAM **    -- CONSTITUTIONAL: AOx3. NAD.   -- ABDOMEN: Soft, non distended, mild tenderness to right abdomen, no guarding, no rebound.       ** LABS **                 13.7   5.20   )----------(  214       ( 23 Aug 2018 05:30 )               41.3      138    |  100    |  10     ----------------------------<  80         ( 23 Aug 2018 05:30 )  4.2     |  25     |  0.75     Ca    9.0        ( 23 Aug 2018 05:30 )  Phos  3.2       ( 23 Aug 2018 05:30 )  Mg     2.0       ( 23 Aug 2018 05:30 )      PT/INR -  12.8 SEC / 1.15    ( 23 Aug 2018 05:30 )       PTT -  29.1 SEC   ( 23 Aug 2018 05:30 )  CAPILLARY BLOOD GLUCOSE

## 2018-08-23 NOTE — CHART NOTE - NSCHARTNOTEFT_GEN_A_CORE
POST OP CHECK:     Patient seen and examined at bedside, no acute events post surgically.   Complaining of mild pain to abdomen, no N/V.   Resting comfortably, all VSS.    PE:     General: No acute distress  Abdomen: Soft, non tender, non distended, incision CDI.   Psych: Flat affect, responds to questions appropriately.

## 2018-08-23 NOTE — DISCHARGE NOTE ADULT - CONDITIONS AT DISCHARGE
Patient discharge to [facility]. Vital Signs stable. IV removed, catheter intact, no signs of redness or swelling. Patient left [facility] in [facility] with security transport via wheelchair with PCA Sheridan Browning from [facility].

## 2018-08-23 NOTE — DISCHARGE NOTE ADULT - PLAN OF CARE
You went to the OR for a laparoscopic appendectomy Follow up with Dr. Storey in one week, call for an appointment. Please follow up with your primary care physician regarding your hospitalization. Do not drive while taking pain medications. Keep wound dry and clean

## 2018-08-23 NOTE — DISCHARGE NOTE ADULT - MEDICATION SUMMARY - MEDICATIONS TO TAKE
I will START or STAY ON the medications listed below when I get home from the hospital:    LORazepam 2 mg oral tablet  -- orally every 6 hours, As Needed  -- Indication: For home med    Benadryl  -- 50 milligram(s) by mouth every 4 hours, As Needed  -- Indication: For home med    Haldol 5 mg oral tablet  -- 5 milligram(s) by mouth every 6 hours, As Needed  -- Indication: For home med    risperiDONE 4 mg oral tablet  -- 4 milligram(s) by mouth once a day (at bedtime)  -- Indication: For home med    senna oral tablet  -- 2 tab(s) by mouth once a day (at bedtime)  -- Indication: For home med    docusate sodium 100 mg oral capsule  -- 1 cap(s) by mouth 3 times a day  -- Indication: For home med

## 2018-08-23 NOTE — DISCHARGE NOTE ADULT - PATIENT PORTAL LINK FT
You can access the [facility] Patient Portal, offered by [facility], by registering with the following website: [email or web address]

## 2018-08-23 NOTE — PROGRESS NOTE ADULT - ASSESSMENT
ASSESSMENT:  28 year old male with several days of abdominal pain, mild appendiceal thickening, concern for appendicitis, prepped for OR;     PLAN:   - Pain control  - Continue bowel regimen, follow GI status   - OR today   - VTE ppx   - Continue home medications   - Follow up psych   - Post op check s/p appy

## 2018-08-23 NOTE — DISCHARGE NOTE ADULT - CARE PLAN
Principal Discharge DX:	Appendicitis Principal Discharge DX:	Appendicitis  Goal:	You went to the OR for a laparoscopic appendectomy  Assessment and plan of treatment:	Follow up with Dr. Storey in one week, call for an appointment. Please follow up with your primary care physician regarding your hospitalization. Do not drive while taking pain medications. Keep wound dry and clean

## 2018-08-23 NOTE — DISCHARGE NOTE ADULT - HOSPITAL COURSE
28 year old male presenting with 3 day history of severe abdominal pain, associated with anorexia and bloating. Patient states he's been unable to eat due to the pain. Denies eating anything unusual. Denies fevers, chills, emesis, change in bowel habits. Recently admitted at [facility] ([facility]) for schizophrenia with acute psychotic decompensation (admitted 8/3/18), transferred to [facility] for further assessment following the development of abdominal pain with concern for appendicitis. Patient reports pain on the right and left sides of his abdomen, and moderate midline discomfort.  CT scan: No nephroureterolithiasis or hydroureteronephrosis.  Borderline enlargement of the tip of the appendix. This may indicate early appendicitis. Please correlate with clinical findings.  Psyc consulted, saying patient has capacity  Patient taken to the OR on 8/23 for a laparoscopic appendectomy. Post operatively patient hemodynamically stable and transferred to the floor. Pain well controlled and diet advanced and tolerated.  Psyc continuing to follow  Patient stable for discharge to return to [facility]

## 2018-08-24 ENCOUNTER — INPATIENT (INPATIENT)
Facility: HOSPITAL | Age: 28
LOS: 13 days | Discharge: HOME | End: 2018-09-07
Attending: PSYCHIATRY & NEUROLOGY | Admitting: PSYCHIATRY & NEUROLOGY
Payer: MEDICAID

## 2018-08-24 VITALS
RESPIRATION RATE: 19 BRPM | TEMPERATURE: 98 F | SYSTOLIC BLOOD PRESSURE: 96 MMHG | HEART RATE: 66 BPM | OXYGEN SATURATION: 94 % | DIASTOLIC BLOOD PRESSURE: 55 MMHG

## 2018-08-24 VITALS — HEIGHT: 67 IN | TEMPERATURE: 99 F | WEIGHT: 156.97 LBS

## 2018-08-24 DIAGNOSIS — F32.1 MAJOR DEPRESSIVE DISORDER, SINGLE EPISODE, MODERATE: ICD-10-CM

## 2018-08-24 PROBLEM — F29 UNSPECIFIED PSYCHOSIS NOT DUE TO A SUBSTANCE OR KNOWN PHYSIOLOGICAL CONDITION: Chronic | Status: ACTIVE | Noted: 2018-08-21

## 2018-08-24 PROCEDURE — 99232 SBSQ HOSP IP/OBS MODERATE 35: CPT

## 2018-08-24 RX ORDER — SOD SULF/SODIUM/NAHCO3/KCL/PEG
4000 SOLUTION, RECONSTITUTED, ORAL ORAL ONCE
Qty: 0 | Refills: 0 | Status: COMPLETED | OUTPATIENT
Start: 2018-08-24 | End: 2018-08-24

## 2018-08-24 RX ORDER — DOCUSATE SODIUM 100 MG
100 CAPSULE ORAL THREE TIMES A DAY
Qty: 0 | Refills: 0 | Status: DISCONTINUED | OUTPATIENT
Start: 2018-08-24 | End: 2018-08-24

## 2018-08-24 RX ORDER — ACETAMINOPHEN 500 MG
650 TABLET ORAL EVERY 6 HOURS
Qty: 0 | Refills: 0 | Status: DISCONTINUED | OUTPATIENT
Start: 2018-08-24 | End: 2018-08-24

## 2018-08-24 RX ORDER — IBUPROFEN 200 MG
600 TABLET ORAL EVERY 6 HOURS
Qty: 0 | Refills: 0 | Status: DISCONTINUED | OUTPATIENT
Start: 2018-08-24 | End: 2018-08-24

## 2018-08-24 RX ORDER — MULTIVIT WITH MIN/MFOLATE/K2 340-15/3 G
300 POWDER (GRAM) ORAL ONCE
Qty: 0 | Refills: 0 | Status: COMPLETED | OUTPATIENT
Start: 2018-08-24 | End: 2018-08-24

## 2018-08-24 RX ORDER — SENNA PLUS 8.6 MG/1
2 TABLET ORAL
Qty: 0 | Refills: 0 | COMMUNITY
Start: 2018-08-24

## 2018-08-24 RX ORDER — DOCUSATE SODIUM 100 MG
1 CAPSULE ORAL
Qty: 0 | Refills: 0 | COMMUNITY
Start: 2018-08-24

## 2018-08-24 RX ADMIN — Medication 4000 MILLILITER(S): at 15:44

## 2018-08-24 RX ADMIN — OXYCODONE HYDROCHLORIDE 5 MILLIGRAM(S): 5 TABLET ORAL at 07:19

## 2018-08-24 RX ADMIN — Medication 5 MILLIGRAM(S): at 18:23

## 2018-08-24 RX ADMIN — OXYCODONE HYDROCHLORIDE 5 MILLIGRAM(S): 5 TABLET ORAL at 19:24

## 2018-08-24 RX ADMIN — OXYCODONE HYDROCHLORIDE 5 MILLIGRAM(S): 5 TABLET ORAL at 07:57

## 2018-08-24 RX ADMIN — OXYCODONE HYDROCHLORIDE 5 MILLIGRAM(S): 5 TABLET ORAL at 11:22

## 2018-08-24 RX ADMIN — ENOXAPARIN SODIUM 40 MILLIGRAM(S): 100 INJECTION SUBCUTANEOUS at 18:25

## 2018-08-24 RX ADMIN — OXYCODONE HYDROCHLORIDE 5 MILLIGRAM(S): 5 TABLET ORAL at 12:22

## 2018-08-24 RX ADMIN — Medication 650 MILLIGRAM(S): at 15:44

## 2018-08-24 RX ADMIN — OXYCODONE HYDROCHLORIDE 5 MILLIGRAM(S): 5 TABLET ORAL at 18:24

## 2018-08-24 RX ADMIN — Medication 300 MILLILITER(S): at 07:11

## 2018-08-24 RX ADMIN — Medication 650 MILLIGRAM(S): at 20:55

## 2018-08-24 RX ADMIN — Medication 650 MILLIGRAM(S): at 16:44

## 2018-08-24 NOTE — PROGRESS NOTE BEHAVIORAL HEALTH - NSBHCHARTREVIEWVS_PSY_A_CORE FT
Vital Signs Last 24 Hrs  T(C): 36.8 (24 Aug 2018 10:55), Max: 37.6 (23 Aug 2018 17:42)  T(F): 98.3 (24 Aug 2018 10:55), Max: 99.6 (23 Aug 2018 17:42)  HR: 63 (24 Aug 2018 10:55) (52 - 64)  BP: 102/59 (24 Aug 2018 10:55) (96/60 - 120/56)  BP(mean): --  RR: 18 (24 Aug 2018 10:55) (16 - 20)  SpO2: 96% (24 Aug 2018 10:55) (93% - 97%)

## 2018-08-24 NOTE — PROGRESS NOTE BEHAVIORAL HEALTH - NSBHFUPINTERVALHXFT_PSY_A_CORE
Pt was interviewed with his nursing staff member who is fluent in [place].  Pt is calm and cooperative, but complains of some pain.  He understands that he will be transferred back to [facility] to a different unit from where he was before to [facility], for continued treatment for his psychosis.  He says he does not have a place to stay since he is not able to return to his home.

## 2018-08-24 NOTE — PROGRESS NOTE BEHAVIORAL HEALTH - RISK ASSESSMENT
Risk factors include acute and chronic medical issues, multiple previous inpatient psychiatric hospitalizations, acute psychosis, history of substance abuse. Protective factors include social support, no past psych hosp or SA,   Pt denies SI/HI/AH/VH, manic or psychotic symptoms.

## 2018-08-24 NOTE — PROGRESS NOTE BEHAVIORAL HEALTH - NSBHCONSULTRECOMMENDOTHER_PSY_A_CORE FT
Monitor QTC hold antipsychotic for QTC >500   5. Pt has capacity to sign consent appendectomy    PT return to [facility] safety and stabilization   Psychiatry will follow

## 2018-08-24 NOTE — PROGRESS NOTE ADULT - SUBJECTIVE AND OBJECTIVE BOX
ANESTHESIA POSTOP CHECK    28y Male POSTOP DAY 1     No COMPLAINTS    NO APPARENT ANESTHESIA COMPLICATIONS

## 2018-08-24 NOTE — PROGRESS NOTE BEHAVIORAL HEALTH - NSBHCHARTREVIEWLAB_PSY_A_CORE FT
13.7   5.20  )-----------( 214      ( 23 Aug 2018 05:30 )             41.3   08-23    138  |  100  |  10  ----------------------------<  80  4.2   |  25  |  0.75    Ca    9.0      23 Aug 2018 05:30  Phos  3.2     08-23  Mg     2.0     08-23

## 2018-08-24 NOTE — PROGRESS NOTE BEHAVIORAL HEALTH - NSBHCONSULTHANDOFF_PSY_A_CORE FT
left message on [employer] [phone] for pt's attending psychiatrist with pager number [phone] for call back.

## 2018-08-24 NOTE — PROGRESS NOTE ADULT - SUBJECTIVE AND OBJECTIVE BOX
INTERVAL EVENTS:   No events overnight  AVSS    SUBJECTIVE: Pt seen + examined  -flatus, -bm  pain well-controlled  tolerating diet    ---------------------------------------------------------------------------------------------   VITALS  T(C): 37 (08-24-18 @ 05:42), Max: 37.6 (08-23-18 @ 17:42)  HR: 52 (08-24-18 @ 05:42) (52 - 80)  BP: 108/70 (08-24-18 @ 05:42) (96/60 - 144/67)  RR: 16 (08-24-18 @ 05:42) (14 - 20)  SpO2: 96% (08-24-18 @ 05:42) (93% - 100%)  CAPILLARY BLOOD GLUCOSE          Is/Os    08-23 @ 07:01  -  08-24 @ 07:00  --------------------------------------------------------  IN:    Oral Fluid: 240 mL  Total IN: 240 mL    OUT:    Voided: 1850 mL  Total OUT: 1850 mL    Total NET: -1610 mL          ---------------------------------------------------------------------------------------------   PHYSICAL EXAM: ***  General: NAD, Lying in bed comfortably  Neuro: alert, oriented x3  HEENT: NC/AT, EOMI  Resp: Good effort  GI/Abd: Soft, NT/ND  Musculoskeletal: All 4 extremities moving spontaneously    ---------------------------------------------------------------------------------------------   MEDICATIONS (STANDING): acetaminophen   Tablet. 650 milliGRAM(s) Oral every 6 hours  enoxaparin Injectable 40 milliGRAM(s) SubCutaneous every 24 hours  risperiDONE   Tablet 4 milliGRAM(s) Oral at bedtime  senna 2 Tablet(s) Oral at bedtime    MEDICATIONS (PRN):diphenhydrAMINE   Capsule 50 milliGRAM(s) Oral every 4 hours PRN Extrapyramidal symptoms  haloperidol    Injectable 2 milliGRAM(s) IV Push every 6 hours PRN severe agitation  haloperidol    Injectable 2 milliGRAM(s) IntraMuscular every 6 hours PRN severe agitation  ibuprofen  Tablet 600 milliGRAM(s) Oral every 6 hours PRN Mild Pain (1-4)  LORazepam     Tablet 2 milliGRAM(s) Oral every 6 hours PRN Agitation  LORazepam   Injectable 2 milliGRAM(s) IV Push every 6 hours PRN severe anxiety or agitation  oxyCODONE    IR 5 milliGRAM(s) Oral every 4 hours PRN moderate to severe pain  QUEtiapine 25 milliGRAM(s) Oral every 6 hours PRN agitation      ---------------------------------------------------------------------------------------------   LABS  CBC (08-23 @ 05:30)                              13.7                           5.20    )----------------(  214        49.9  % Neutrophils, 30.8  % Lymphocytes, ANC: 2.60                                41.3      BMP (08-23 @ 05:30)             138     |  100     |  10    		Ca++ --      Ca 9.0                ---------------------------------( 80    		Mg 2.0                4.2     |  25      |  0.75  			Ph 3.2         Coags (08-23 @ 05:30)  aPTT 29.1 / INR 1.15 / PT 12.8    ---------------------------------------------------------------------------------------------       ASSESSMENT  28 year old male s/p lap appendectomy for acute appendicitis    PLAN:   - d/c to [facility] if +BMs  - Mg Citrate for BM, monitor BM  - Pain control w/ Tylenol and Toradol  - VTE ppx   - Continue home medications

## 2018-08-24 NOTE — PROGRESS NOTE BEHAVIORAL HEALTH - SUMMARY
Pt is a 27 y/o Thai-speaking male, single, noncaregiver, lives alone, [place] schizophrenia, multiple inpatient hospitalizations, most recent [facility] for schizophrenia with acute psychotic decompensation (admitted 8/3/18), no known history of suicide attempts or violence, no known legal issues, no known PMH, past history of crack-cocaine and marijuana use per brother (today's urine tox is negative. Pt presents to [facility] transferred from [facility] c/o abdominal pain with concern for appendicitis. Psychiatry initially was consulted for capacity for appendectomy.  Pt is s/p laparoscopic appendectomy for acute appendicitis, now ready for transfer back to [facility].    Seen and evaluated with . (pt's nursing staff).  Plan  1. Continue Risperdal 4mg qhs as ordered  2. PRN Seroquel 25mg q 6hr for agitation  3. PRN haldol 2mg IV/IM q6hr severe agitation   4. Monitor QTC hold antipsychotic for QTC >500   5. Pt has capacity to sign consent appendectomy    PT return to [facility] for safety and stabilization

## 2018-08-24 NOTE — PROGRESS NOTE BEHAVIORAL HEALTH - NSBHCONSULTMEDPLAN_PSY_A_CORE FT
pt will need pain medication, Percocet prns for his abdominal pain secondary to his lap. appendectomy last night.

## 2018-08-25 DIAGNOSIS — K37 UNSPECIFIED APPENDICITIS: ICD-10-CM

## 2018-08-25 DIAGNOSIS — F29 UNSPECIFIED PSYCHOSIS NOT DUE TO A SUBSTANCE OR KNOWN PHYSIOLOGICAL CONDITION: ICD-10-CM

## 2018-08-25 PROCEDURE — 99223 1ST HOSP IP/OBS HIGH 75: CPT

## 2018-08-25 PROCEDURE — 99232 SBSQ HOSP IP/OBS MODERATE 35: CPT

## 2018-08-25 RX ORDER — DIPHENHYDRAMINE HCL 50 MG
50 CAPSULE ORAL EVERY 6 HOURS
Qty: 0 | Refills: 0 | Status: DISCONTINUED | OUTPATIENT
Start: 2018-08-25 | End: 2018-09-07

## 2018-08-25 RX ORDER — RISPERIDONE 4 MG/1
4 TABLET ORAL ONCE
Qty: 0 | Refills: 0 | Status: COMPLETED | OUTPATIENT
Start: 2018-08-25 | End: 2018-08-25

## 2018-08-25 RX ORDER — HYDROXYZINE HCL 10 MG
50 TABLET ORAL
Qty: 0 | Refills: 0 | Status: DISCONTINUED | OUTPATIENT
Start: 2018-08-25 | End: 2018-09-07

## 2018-08-25 RX ORDER — RISPERIDONE 4 MG/1
4 TABLET ORAL AT BEDTIME
Qty: 0 | Refills: 0 | Status: DISCONTINUED | OUTPATIENT
Start: 2018-08-25 | End: 2018-09-07

## 2018-08-25 RX ORDER — HALOPERIDOL DECANOATE 100 MG/ML
5 INJECTION INTRAMUSCULAR EVERY 6 HOURS
Qty: 0 | Refills: 0 | Status: DISCONTINUED | OUTPATIENT
Start: 2018-08-25 | End: 2018-09-07

## 2018-08-25 RX ORDER — HALOPERIDOL DECANOATE 100 MG/ML
5 INJECTION INTRAMUSCULAR ONCE
Qty: 0 | Refills: 0 | Status: DISCONTINUED | OUTPATIENT
Start: 2018-08-25 | End: 2018-09-07

## 2018-08-25 RX ORDER — ACETAMINOPHEN 500 MG
650 TABLET ORAL EVERY 6 HOURS
Qty: 0 | Refills: 0 | Status: DISCONTINUED | OUTPATIENT
Start: 2018-08-25 | End: 2018-09-07

## 2018-08-25 RX ADMIN — Medication 650 MILLIGRAM(S): at 20:35

## 2018-08-25 RX ADMIN — Medication 650 MILLIGRAM(S): at 18:49

## 2018-08-25 RX ADMIN — RISPERIDONE 4 MILLIGRAM(S): 4 TABLET ORAL at 20:35

## 2018-08-25 RX ADMIN — Medication 650 MILLIGRAM(S): at 12:25

## 2018-08-25 RX ADMIN — RISPERIDONE 4 MILLIGRAM(S): 4 TABLET ORAL at 00:40

## 2018-08-25 RX ADMIN — Medication 650 MILLIGRAM(S): at 20:42

## 2018-08-25 NOTE — CONSULT NOTE ADULT - PROBLEM SELECTOR RECOMMENDATION 2
Admitted to [facility] for management of psychosis. Management per psychiatry team.

## 2018-08-25 NOTE — CONSULT NOTE ADULT - SUBJECTIVE AND OBJECTIVE BOX
HPI:   28 M PMH psychosis presents for management of psychosis. Patient recently underwent laparoscopic appendectomy for appendicitis. He reports mild abdominal pain at the site of his surgery. He also reports a normal, formed, non-bloody bowel movement overnight. No vomiting.    PAST MEDICAL & SURGICAL HISTORY:  Psychosis  No significant past surgical history      Review of Systems:   CONSTITUTIONAL: No fever, weight loss, or fatigue  EYES: No eye pain, visual disturbances, or discharge  ENMT:  No difficulty hearing, tinnitus, vertigo; No sinus or throat pain  NECK: No pain or stiffness  RESPIRATORY: No cough, wheezing, chills or hemoptysis; No shortness of breath  CARDIOVASCULAR: No chest pain, palpitations, dizziness, or leg swelling  GASTROINTESTINAL: +abdominal pain. No nausea, vomiting, or hematemesis; No diarrhea or constipation. No melena or hematochezia.  GENITOURINARY: No dysuria, frequency, hematuria, or incontinence  NEUROLOGICAL: No headaches, memory loss, loss of strength, numbness, or tremors  SKIN: No itching, burning, rashes, or lesions   LYMPH NODES: No enlarged glands  ENDOCRINE: No heat or cold intolerance; No hair loss  MUSCULOSKELETAL: No joint pain or swelling; No muscle, back, or extremity pain  HEME/LYMPH: No easy bruising, or bleeding gums  ALLERGY AND IMMUNOLOGIC: No hives or eczema    Allergies    No Known Allergies    Intolerances        Social History:   Does not smoke, drink, or use drugs per patient report.    FAMILY HISTORY:  No pertinent family history in first degree relatives      MEDICATIONS  (STANDING):  risperiDONE   Tablet 4 milliGRAM(s) Oral at bedtime    MEDICATIONS  (PRN):  diphenhydrAMINE   Capsule 50 milliGRAM(s) Oral every 6 hours PRN Extrapyramidal prophylaxis  diphenhydrAMINE   Injectable 50 milliGRAM(s) IntraMuscular every 6 hours PRN Extrapyramidal prophylaxis  haloperidol     Tablet 5 milliGRAM(s) Oral every 6 hours PRN severe agitation  haloperidol    Injectable 5 milliGRAM(s) IntraMuscular once PRN severe agitation  hydrOXYzine hydrochloride 50 milliGRAM(s) Oral four times a day PRN Anxiety  LORazepam     Tablet 2 milliGRAM(s) Oral three times a day PRN Agitation  LORazepam   Injectable 2 milliGRAM(s) IntraMuscular once PRN Agitation      Vital Signs Last 24 Hrs  T(C): 36.9 (25 Aug 2018 08:17), Max: 37.2 (24 Aug 2018 22:39)  T(F): 98.4 (25 Aug 2018 08:17), Max: 98.9 (24 Aug 2018 22:39)  HR: 66 (24 Aug 2018 18:03) (63 - 66)  BP: 96/55 (24 Aug 2018 18:03) (96/55 - 102/59)  BP(mean): --  RR: 19 (24 Aug 2018 18:03) (18 - 19)  SpO2: 94% (24 Aug 2018 18:03) (94% - 96%)  CAPILLARY BLOOD GLUCOSE            PHYSICAL EXAM:  GENERAL: NAD, well-developed, seated in chair, pleasant  HEAD:  Atraumatic, Normocephalic  EYES: EOMI, PERRLA, conjunctiva and sclera clear  NECK: Supple, No JVD  CHEST/LUNG: Clear to auscultation bilaterally; No wheeze  HEART: Regular rate and rhythm; No murmurs, rubs, or gallops  ABDOMEN: Soft, Nontender, Nondistended, incision sites healing well, no erythema or discharge; Bowel sounds present  EXTREMITIES:  2+ Peripheral Pulses, No clubbing, cyanosis, or edema  PSYCH: AAOx3  NEUROLOGY: non-focal  SKIN: No rashes or lesions    LABS:  Complete Blood Count + Automated Diff in AM (08.23.18 @ 05:30)    Nucleated RBC #: 0    WBC Count: 5.20 K/uL    RBC Count: 4.47 M/uL    Hemoglobin: 13.7 g/dL    Hematocrit: 41.3 %    Mean Cell Volume: 92.4 fL    Mean Cell Hemoglobin: 30.6 pg    Mean Cell Hemoglobin Conc: 33.2 %    Red Cell Distrib Width: 11.2 %    Platelet Count - Automated: 214 K/uL    MPV: 10.5 fl    Auto Neutrophil #: 2.60 K/uL    Auto Lymphocyte #: 1.60 K/uL    Auto Monocyte #: 0.52 K/uL    Auto Eosinophil #: 0.41 K/uL    Auto Basophil #: 0.06 K/uL    Auto Immature Granulocyte #: 0.01: (Includes meta, myelo and promyelocytes) #    Auto Neutrophil %: 49.9 %    Auto Lymphocyte %: 30.8 %    Auto Monocyte %: 10.0 %    Auto Eosinophil %: 7.9 %    Auto Basophil %: 1.2 %    Auto Immature Granulocyte %: 0.2: (Includes meta, myelo and promyelocytes) %                      EKG(personally reviewed):    RADIOLOGY & ADDITIONAL TESTS:  Comprehensive Metabolic Panel (08.21.18 @ 18:32)    Sodium, Serum: 138 mmol/L    Potassium, Serum: 4.2 mmol/L    Chloride, Serum: 97 mmol/L    Carbon Dioxide, Serum: 27 mmol/L    Blood Urea Nitrogen, Serum: 13 mg/dL    Creatinine, Serum: 0.83 mg/dL    Glucose, Serum: 98 mg/dL    Calcium, Total Serum: 9.5 mg/dL    Protein Total, Serum: 8.3 g/dL    Albumin, Serum: 4.3 g/dL    Bilirubin Total, Serum: 0.8: Delta: 0.5 on 08/20/  Delta: 0.5 on 08/20/ mg/dL    Alkaline Phosphatase, Serum: 58 u/L    Aspartate Aminotransferase (AST/SGOT): 23 u/L    Alanine Aminotransferase (ALT/SGPT): 27 u/L    eGFR if Non : 120: The units for eGFR are ml/min/1.73m2 (normalized body  surface area). The eGFR is calculated from a serum  creatinine using the CKD-EPI equation. Other variables  required for calculation are race, age and sex. Among  patients with chronic kidney disease (CKD), the eGFR is  useful in determining the stage of disease according to  KDOQI CKD classification. All eGFR results are reported  numerically with the following interpretation.    GFR  (ml/min/1.73 m2)          W/KIDNEY DAMAGE    W/O KIDNEY DMG  ==========================================================  >= 90.......................Stage 1..............Normal  60-89.......................Stage 2...........Decreased GFR  30-59.......................Stage 3..............Stage 3  15-29.......................Stage 4..............Stage 4  < 15........................Stage 5..............Stage 5    Each stage of CKD assumes that the associated GFR level  has been in effect for at least 3 months. Determination of  stages one and two (with eGFR > 59ml/min/m2) requires  estimation of kidney damage for at least 3 months as  defined by structural or functional abnormalities.    Limitations: All estimates of GFR will be less accurate  for patients at extremes of muscle mass (including but  not limited to frail elderly, critically ill, or cancer  patients), those with unusual diets, and those with  conditions associated with reduced secretion or  extrarenal elimination of creatinine. The eGFR equation  is not recommended for use in patients with unstable  creatinine levels. mL/min    eGFR if : 139 mL/min      Imaging Personally Reviewed:    Consultant(s) Notes Reviewed:      Care Discussed with Consultants/Other Providers:

## 2018-08-25 NOTE — CONSULT NOTE ADULT - PROBLEM SELECTOR RECOMMENDATION 9
Patient underwent lap appendectomy on 8/23. Reports BM overnight. Remains with mild abdominal pain near incision sites. Will prescribe APAP 650mg q6h prn.

## 2018-08-25 NOTE — CONSULT NOTE ADULT - ASSESSMENT
28 M PMH psychosis presents for management of psychosis. Patient recently underwent laparoscopic appendectomy for appendicitis. He reports mild abdominal pain at the site of his surgery.

## 2018-08-26 PROCEDURE — 99232 SBSQ HOSP IP/OBS MODERATE 35: CPT

## 2018-08-26 RX ADMIN — Medication 650 MILLIGRAM(S): at 08:48

## 2018-08-26 RX ADMIN — Medication 650 MILLIGRAM(S): at 18:57

## 2018-08-26 RX ADMIN — Medication 650 MILLIGRAM(S): at 10:57

## 2018-08-26 RX ADMIN — RISPERIDONE 4 MILLIGRAM(S): 4 TABLET ORAL at 20:13

## 2018-08-27 PROCEDURE — 99232 SBSQ HOSP IP/OBS MODERATE 35: CPT

## 2018-08-27 RX ADMIN — Medication 650 MILLIGRAM(S): at 17:31

## 2018-08-27 RX ADMIN — Medication 650 MILLIGRAM(S): at 03:42

## 2018-08-27 RX ADMIN — Medication 650 MILLIGRAM(S): at 03:43

## 2018-08-27 RX ADMIN — RISPERIDONE 4 MILLIGRAM(S): 4 TABLET ORAL at 20:08

## 2018-08-28 RX ADMIN — Medication 650 MILLIGRAM(S): at 04:43

## 2018-08-29 PROCEDURE — 99232 SBSQ HOSP IP/OBS MODERATE 35: CPT

## 2018-08-29 RX ADMIN — Medication 650 MILLIGRAM(S): at 03:57

## 2018-08-29 RX ADMIN — RISPERIDONE 4 MILLIGRAM(S): 4 TABLET ORAL at 20:33

## 2018-08-29 RX ADMIN — Medication 650 MILLIGRAM(S): at 13:30

## 2018-08-30 PROCEDURE — 99232 SBSQ HOSP IP/OBS MODERATE 35: CPT

## 2018-08-30 RX ADMIN — RISPERIDONE 4 MILLIGRAM(S): 4 TABLET ORAL at 20:19

## 2018-08-31 PROCEDURE — 99232 SBSQ HOSP IP/OBS MODERATE 35: CPT

## 2018-08-31 RX ADMIN — RISPERIDONE 4 MILLIGRAM(S): 4 TABLET ORAL at 20:52

## 2018-09-01 RX ADMIN — RISPERIDONE 4 MILLIGRAM(S): 4 TABLET ORAL at 21:15

## 2018-09-02 RX ADMIN — RISPERIDONE 4 MILLIGRAM(S): 4 TABLET ORAL at 21:00

## 2018-09-03 RX ADMIN — RISPERIDONE 4 MILLIGRAM(S): 4 TABLET ORAL at 20:36

## 2018-09-04 PROCEDURE — 99232 SBSQ HOSP IP/OBS MODERATE 35: CPT

## 2018-09-04 RX ADMIN — RISPERIDONE 4 MILLIGRAM(S): 4 TABLET ORAL at 20:39

## 2018-09-05 PROCEDURE — 99232 SBSQ HOSP IP/OBS MODERATE 35: CPT

## 2018-09-05 RX ORDER — RISPERIDONE 4 MG/1
1 TABLET ORAL
Qty: 30 | Refills: 0 | OUTPATIENT
Start: 2018-09-05 | End: 2018-10-04

## 2018-09-05 RX ORDER — HALOPERIDOL DECANOATE 100 MG/ML
5 INJECTION INTRAMUSCULAR
Qty: 0 | Refills: 0 | COMMUNITY

## 2018-09-05 RX ORDER — RISPERIDONE 4 MG/1
4 TABLET ORAL
Qty: 0 | Refills: 0 | COMMUNITY

## 2018-09-05 RX ORDER — DIPHENHYDRAMINE HCL 50 MG
50 CAPSULE ORAL
Qty: 0 | Refills: 0 | COMMUNITY

## 2018-09-05 RX ADMIN — RISPERIDONE 4 MILLIGRAM(S): 4 TABLET ORAL at 20:14

## 2018-09-06 RX ADMIN — RISPERIDONE 4 MILLIGRAM(S): 4 TABLET ORAL at 20:24

## 2018-09-07 VITALS — TEMPERATURE: 98 F | RESPIRATION RATE: 17 BRPM

## 2018-09-07 PROBLEM — Z00.00 ENCOUNTER FOR PREVENTIVE HEALTH EXAMINATION: Status: ACTIVE | Noted: 2018-09-07

## 2018-09-07 PROCEDURE — 99238 HOSP IP/OBS DSCHRG MGMT 30/<: CPT | Mod: 25

## 2019-09-30 NOTE — ED ADULT TRIAGE NOTE - ACCOMPANIED BY
Unclear baseline, suspect combination of hemodynamic WINSTON as well as possible MARLENA in setting of some likely underlying CKD.   - Continue to trend  - Renal US showed no hydronephrosis and only medical renal disease.   - Appreciate renal - likely due to contrast.  - Lasix on hold for now. Unclear baseline, suspect combination of hemodynamic WINSTON as well as possible MARLENA in setting of some likely underlying CKD.   - Continue to trend  - Renal US showed no hydronephrosis and only medical renal disease.   - Appreciate renal - likely due to contrast.  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

## 2020-07-01 NOTE — ED ADULT TRIAGE NOTE - NS ED NURSE AMBULANCES
This appears to be a ganglion cyst.  No prescription to be sent.  Patient was instructed to use heat and ibuprofen to treat her symptoms.   [facility]

## 2020-08-10 NOTE — ED BEHAVIORAL HEALTH ASSESSMENT NOTE - NS ED B[facility] MED ROS ENDOCRINE
The Low FODMAPs Diet    Symptoms of abdominal pain, gas, bloating, flatulence, burping, constipation and/or diarrhea are commonly present in various gastrointestinal disorders but are hard to treat and minimize symptoms. Often these symptoms are called Irritable Bowel Syndrome (IBS). If you have IBS or a constellation of chronic GI complaints for which no other disease or condition has been identified, consider a diet low in fermentable oligo-,di-, and monosaccharides and polyols (FODMAPS). This is a diet that  limits but does not eliminate, foods that contain:  · Lactose  · Fructose  · Fructans  · Galactans  · Sugar alcohols (polyols)    These compounds in food are poorly absorbed, highly osmotic and rapidly fermented by GI bacteria, leading to increased water and gas in the GI tract, which then leads to GI tract distention that causes changes in GI motility, bloating, discomfort and flatulence..     To assess your tolerance for those compounds, eliminate foods high in FODMAPs for 6-8 weeks and then gradually reintroduce foods to identify bothersome foods. Reintroduce one food every four days with a 2-week break between bothersome foods. The goal is to identify the threshold at which you are able to consume FODMAP containing foods without causing bothersome GI symptoms.     Lactose  Lactose is the carbohydrate found in cow's, sheep's, and goat's milk. Lactose intolerance is caused by partial or complete lack of enzyme lactase which digests lactose. When lactose if not completely digested, it contributes to abdominal bloating, pain, gas, and diarrhea, often occuring 30 minutes to 2 hours following the consumption of milk and milkl products.   Limit foods high in lactose such as yogurt, ice cream, milk, and ricotta and cottage cheeses. See FODMAPs in Food table.    Fructose  Fructose is a carbohydrate found in fruit, honey, high- fructose corn syrup (HFCS) and agave syrup, but not all fructose-containing foods need  to be limited on a low FODMAPs diet. Fructose malabsorption is similar to lactose intolerance in that fructose is not completely digested in the GI tract due to the lack of an enzyme, but unlike lactose intolerance, the absorption of fructose is dependent on another carbohydrate, glucose. Therefore, fructose-containing foods with 1:1 ratio of fructose to glucose are generally well tolerated on the FODMAPs diet and conversely, foods with excess fructose compared with glucose, such as apples, pears, and mangoes will likely trigger abdominal symptoms.  Limit foods with excess free fructose, See FODMAPs in Food table.       Fructans  Fructans are carbohydrates that are completely malabsorbed because the intestine lacks an enzyme to break their fructose-fructose bond. For this reason, fructans can contribute to bloating, gas, and pain. Wheat accounts for the majority of people's fructan intake.   Limit wheat, onions and garlic along with other vegetables high in fructans, see FODMAPs in Food table.    Galactans  Galactans are carbohydrates that are malabsorbed for the same reason as fructans; the intestine does not have nevin enzymes needed to break down galactans. Consequently, galactans can contribute to gas and GI distress.   Limit beans and lentils. See FODMAPs in Food table.     Polyols  Polyols are also known as sugar alcohols. They are foind naturally in some fruits and vegetables and added as sweeteners to sugar-free gums, mints, cough drops, and medications. Sugar alcohols have varying effects on the bowel.   Limit sugar alcohols, sorbitol, xylitol, mannitol and maltitol. See FODMAPs in Food table.     FODMAPs Elimination and Challenge    Use the table below to guide your choices. Eliminate foods high in FODMAPs for 6-8 weeks. You should notice an improvement in your GI complaints within one week of following a low FODMAP diet. Follow a low FODMAP diet for a full 6-8 weeks before assessing its effectiveness and  reintroducing foods high in FODMAPs. At that time you will work with your Nutrition Counselor to reintroduce one test food every four days; if you react to a food, do not test another for two weeks.     Foods that are high in FODMAPs may aggravate your GI complaints but they are not causing an allergic reaction or an autoimmune reaction in your body. The foods high in FODMAPs that elicit GI symptoms are causing functional discomfort in your gut that result in gas, bloating, distention etc,     These are the test foods for each category:  · Lactose: 1/2- 1 cup milk  · Fructose: 1/2 matti or 1-2 teaspoons of honey  · Fructans: 2 slices wheat bread, 1 garlic glove or 1 cup pasta  · Galactans: 1/2 cup lentils or chickpeas  · Sugar alcohols (polyols): Sorbitol, 2-4 dried apricots; Mannitol, 1/2 cup mushrooms    You will work with your Nutrition Counselor to determine the order of challenge and which foods to use.     Type of Food High in FODMAPs Low in FODMAPs   Vegetables Artichokes  Asparagus  Sugar snap peas  Cabbage,   Onions,   Shallots,   Leeks,   Onion & garlic salt powders, Garlic,   Cauliflower,   Mushrooms,   Pumpkin,   Green peppers Bok jaymie, bean sprouts, red bell peppers, lettuce, spinach, carrots, chives, spring onion (green part only), cucumber, eggplant, green beans, tomatoes, potatoes,  garlic infused oil; saute onion and garlic in oil and then discard,   Water chestnuts,   <1 stick celery  <1/2 cup sweet potato, broccoli, [place] sprouts, butternut, squash, fennel, <10 snow peas.    Grains Wheat,   Rye,   Barley - large quantities  SNutspelt Brown rice, oats, oat bran, Quinoa, corn, gluten-free break, cereals, pastas and crackers without honey, apple/pear juice, agave or HCFS, Namaste Food Perfect Flour Blend or Bishnu Jas Gluten Free Multi Purpose Flour   Legumes Chickpeas, hummus, kidney beans, baked beans, edamame, soy milk, lentils Tofu  Peanuts,  <1/3 cup green peas   Nuts and seeds Pistachios  10-15 max or 1-2 tablespoons almonds, macadamia nuts, pecans, pine nuts, walnuts, pumpkin seeds, sesame seeds, sunflower seeds   Sweeteners Honey, agave, high fructose corn syrup, sorbitol, Mannitol, Xylitol, Maltitol, Splenda (may alter friendly gut kvng) Sugar  Glucose, sucrose  Pure maple syrup  Aspartame   Additives Inulin; FOS (Fructo-Oligosaccharides); Sugar alcohols (see sweeteners); Chicory root    Alcohol Rum Wine, beer, vodka, gin-- limit to one serving as all alcohol is a gastric irritant   Protein-rich food  Fish, chicken, turkey, eggs, meat   Fat-rich food  Olive and canola oil; olives; <1/4 avocado   Milk Milk: cow, sheep, goat, soy. Creamy soups made with milk  Evaporated milk  Sweetened condensed milk Milk: almond, coconut, hazelnut, hemp, rice. Lactose free cow's milk, Lactose free kefir  Lactose free ice cream (non dairy alternatives)  Purchase lactase enzyme to make your own evaporated or condensed milk if needed   Yogurt Cow's milk yogurt (Greek yogurt is lowest in FODMAPs)  Soy yogurt Coconut milk yogurt   Cheese Cottage cheese  Ricotta cheese  Mascarpone cheese Hard cheeses including cheddar, Swiss, brie, blue cheese, mozzarella, parmesan and feta. No more than 2 tablespoons ricotta or cottage cheese   Dairy-based condiments Sour cream  Whipping cream Butter  Half and half  Cream cheese   Dairy-based desserts Ice cream  Frozen yogurt  [place] Sorbet from FODMAPs friendly fruit   Fruit Apples, pears  Cherries, raspberries,   Blackberries, watermelon, nectarines, white peaches, apricots, plums, peaches, prunes, matti, papaya, persimmon, orange juice, canned fruit, large portions of any fruit Banana, blueberries, strawberries, cantaloupe, honeydew, grapefruit, lemon, lime, grapes, kiwi, pineapple, rhubarb, tangelos, <1/4 avocado, <1 tablespoon dried fruit    *Limit consumption to one low FODMAPs fruit per meal. Consume ripe fruits; firm; less-ripe fruit contains more fructose.      Yes

## 2021-03-30 NOTE — CONSULT NOTE ADULT - ASSESSMENT
28M psychosis with 3 days worsening abd pain    Plan:  Abd pain: WBC normal yesterday, however given current symptoms would r/o appendicitis. Recommend transfer to ED for eval, consider CT abd    Psychosis: Management per primary team [Follow-Up - Clinic] : a clinic follow-up of [[email or web address]] : pulm htn

## 2023-07-26 NOTE — ED BEHAVIORAL HEALTH ASSESSMENT NOTE - NS ED B[facility] MED ROS CARDIOVASCULAR
Unable to assess Drysol Counseling:  I discussed with the patient the risks of drysol/aluminum chloride including but not limited to skin rash, itching, irritation, burning.

## 2025-04-08 NOTE — ED PROVIDER NOTE - NS ED MD DISPO SPECIAL CONSIDERATION1
Plan to get CT scan of neck with IV contrast.  Obtain blood work prior to CT scan to check creatinine level.  Please hold metformin for 3 days prior and 3 days following CT scan.  Drink lots of water after your CT scan.      Plan to see ENT (ear, nose, and throat) to evaluate right neck lymph node with possible biopsy.      Follow up visit in 2 months with oncology team.  Please contact if having worsening swelling to lymph node or other concerning symptoms.  
None